# Patient Record
Sex: FEMALE | Race: WHITE | URBAN - METROPOLITAN AREA
[De-identification: names, ages, dates, MRNs, and addresses within clinical notes are randomized per-mention and may not be internally consistent; named-entity substitution may affect disease eponyms.]

---

## 2017-02-22 ENCOUNTER — OUTPATIENT (OUTPATIENT)
Dept: OUTPATIENT SERVICES | Facility: HOSPITAL | Age: 72
LOS: 1 days | Discharge: HOME | End: 2017-02-22

## 2017-03-18 ENCOUNTER — EMERGENCY (EMERGENCY)
Facility: HOSPITAL | Age: 72
LOS: 0 days | Discharge: HOME | End: 2017-03-19

## 2017-06-27 DIAGNOSIS — K59.00 CONSTIPATION, UNSPECIFIED: ICD-10-CM

## 2017-06-27 DIAGNOSIS — Z87.891 PERSONAL HISTORY OF NICOTINE DEPENDENCE: ICD-10-CM

## 2017-06-27 DIAGNOSIS — R10.30 LOWER ABDOMINAL PAIN, UNSPECIFIED: ICD-10-CM

## 2017-07-12 DIAGNOSIS — Z12.31 ENCOUNTER FOR SCREENING MAMMOGRAM FOR MALIGNANT NEOPLASM OF BREAST: ICD-10-CM

## 2017-12-24 ENCOUNTER — EMERGENCY (EMERGENCY)
Facility: HOSPITAL | Age: 72
LOS: 0 days | Discharge: HOME | End: 2017-12-25
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
## 2017-12-24 DIAGNOSIS — S61.012A LACERATION WITHOUT FOREIGN BODY OF LEFT THUMB WITHOUT DAMAGE TO NAIL, INITIAL ENCOUNTER: ICD-10-CM
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
## 2020-08-22 ENCOUNTER — INPATIENT (INPATIENT)
Facility: HOSPITAL | Age: 75
LOS: 1 days | Discharge: HOME | End: 2020-08-24
Attending: INTERNAL MEDICINE | Admitting: INTERNAL MEDICINE
Payer: MEDICARE

## 2020-08-22 ENCOUNTER — EMERGENCY (EMERGENCY)
Facility: HOSPITAL | Age: 75
LOS: 0 days | Discharge: HOME | End: 2020-08-22
Attending: EMERGENCY MEDICINE
Payer: MEDICARE

## 2020-08-22 VITALS
SYSTOLIC BLOOD PRESSURE: 125 MMHG | RESPIRATION RATE: 18 BRPM | WEIGHT: 115.08 LBS | OXYGEN SATURATION: 99 % | HEIGHT: 65 IN | HEART RATE: 75 BPM | DIASTOLIC BLOOD PRESSURE: 65 MMHG | TEMPERATURE: 97 F

## 2020-08-22 VITALS
TEMPERATURE: 97 F | HEIGHT: 65 IN | SYSTOLIC BLOOD PRESSURE: 216 MMHG | WEIGHT: 115.08 LBS | OXYGEN SATURATION: 94 % | HEART RATE: 101 BPM | RESPIRATION RATE: 20 BRPM | DIASTOLIC BLOOD PRESSURE: 96 MMHG

## 2020-08-22 VITALS
HEART RATE: 72 BPM | DIASTOLIC BLOOD PRESSURE: 68 MMHG | OXYGEN SATURATION: 99 % | SYSTOLIC BLOOD PRESSURE: 127 MMHG | RESPIRATION RATE: 18 BRPM | TEMPERATURE: 97 F

## 2020-08-22 DIAGNOSIS — Z02.9 ENCOUNTER FOR ADMINISTRATIVE EXAMINATIONS, UNSPECIFIED: ICD-10-CM

## 2020-08-22 DIAGNOSIS — R91.1 SOLITARY PULMONARY NODULE: ICD-10-CM

## 2020-08-22 DIAGNOSIS — R04.2 HEMOPTYSIS: ICD-10-CM

## 2020-08-22 DIAGNOSIS — F17.210 NICOTINE DEPENDENCE, CIGARETTES, UNCOMPLICATED: ICD-10-CM

## 2020-08-22 LAB
ALBUMIN SERPL ELPH-MCNC: 4.9 G/DL — SIGNIFICANT CHANGE UP (ref 3.5–5.2)
ALP SERPL-CCNC: 56 U/L — SIGNIFICANT CHANGE UP (ref 30–115)
ALT FLD-CCNC: 22 U/L — SIGNIFICANT CHANGE UP (ref 0–41)
ANION GAP SERPL CALC-SCNC: 12 MMOL/L — SIGNIFICANT CHANGE UP (ref 7–14)
APTT BLD: 32.4 SEC — SIGNIFICANT CHANGE UP (ref 27–39.2)
AST SERPL-CCNC: 28 U/L — SIGNIFICANT CHANGE UP (ref 0–41)
BASOPHILS # BLD AUTO: 0.08 K/UL — SIGNIFICANT CHANGE UP (ref 0–0.2)
BASOPHILS # BLD AUTO: 0.09 K/UL — SIGNIFICANT CHANGE UP (ref 0–0.2)
BASOPHILS NFR BLD AUTO: 0.9 % — SIGNIFICANT CHANGE UP (ref 0–1)
BASOPHILS NFR BLD AUTO: 1.1 % — HIGH (ref 0–1)
BILIRUB SERPL-MCNC: 0.2 MG/DL — SIGNIFICANT CHANGE UP (ref 0.2–1.2)
BLD GP AB SCN SERPL QL: SIGNIFICANT CHANGE UP
BUN SERPL-MCNC: 20 MG/DL — SIGNIFICANT CHANGE UP (ref 10–20)
CALCIUM SERPL-MCNC: 10.1 MG/DL — SIGNIFICANT CHANGE UP (ref 8.5–10.1)
CHLORIDE SERPL-SCNC: 104 MMOL/L — SIGNIFICANT CHANGE UP (ref 98–110)
CO2 SERPL-SCNC: 27 MMOL/L — SIGNIFICANT CHANGE UP (ref 17–32)
CREAT SERPL-MCNC: 0.7 MG/DL — SIGNIFICANT CHANGE UP (ref 0.7–1.5)
EOSINOPHIL # BLD AUTO: 0.09 K/UL — SIGNIFICANT CHANGE UP (ref 0–0.7)
EOSINOPHIL # BLD AUTO: 0.21 K/UL — SIGNIFICANT CHANGE UP (ref 0–0.7)
EOSINOPHIL NFR BLD AUTO: 1.1 % — SIGNIFICANT CHANGE UP (ref 0–8)
EOSINOPHIL NFR BLD AUTO: 2.3 % — SIGNIFICANT CHANGE UP (ref 0–8)
GLUCOSE SERPL-MCNC: 94 MG/DL — SIGNIFICANT CHANGE UP (ref 70–99)
HCT VFR BLD CALC: 38.8 % — SIGNIFICANT CHANGE UP (ref 37–47)
HCT VFR BLD CALC: 39.3 % — SIGNIFICANT CHANGE UP (ref 37–47)
HCT VFR BLD CALC: 43.3 % — SIGNIFICANT CHANGE UP (ref 37–47)
HGB BLD-MCNC: 12.7 G/DL — SIGNIFICANT CHANGE UP (ref 12–16)
HGB BLD-MCNC: 13 G/DL — SIGNIFICANT CHANGE UP (ref 12–16)
HGB BLD-MCNC: 14.1 G/DL — SIGNIFICANT CHANGE UP (ref 12–16)
IMM GRANULOCYTES NFR BLD AUTO: 0.3 % — SIGNIFICANT CHANGE UP (ref 0.1–0.3)
IMM GRANULOCYTES NFR BLD AUTO: 0.5 % — HIGH (ref 0.1–0.3)
INR BLD: 1.01 RATIO — SIGNIFICANT CHANGE UP (ref 0.65–1.3)
LACTATE SERPL-SCNC: 0.7 MMOL/L — SIGNIFICANT CHANGE UP (ref 0.7–2)
LYMPHOCYTES # BLD AUTO: 3.59 K/UL — HIGH (ref 1.2–3.4)
LYMPHOCYTES # BLD AUTO: 3.67 K/UL — HIGH (ref 1.2–3.4)
LYMPHOCYTES # BLD AUTO: 40.6 % — SIGNIFICANT CHANGE UP (ref 20.5–51.1)
LYMPHOCYTES # BLD AUTO: 42.1 % — SIGNIFICANT CHANGE UP (ref 20.5–51.1)
MCHC RBC-ENTMCNC: 30.3 PG — SIGNIFICANT CHANGE UP (ref 27–31)
MCHC RBC-ENTMCNC: 30.4 PG — SIGNIFICANT CHANGE UP (ref 27–31)
MCHC RBC-ENTMCNC: 30.4 PG — SIGNIFICANT CHANGE UP (ref 27–31)
MCHC RBC-ENTMCNC: 32.6 G/DL — SIGNIFICANT CHANGE UP (ref 32–37)
MCHC RBC-ENTMCNC: 32.7 G/DL — SIGNIFICANT CHANGE UP (ref 32–37)
MCHC RBC-ENTMCNC: 33.1 G/DL — SIGNIFICANT CHANGE UP (ref 32–37)
MCV RBC AUTO: 92 FL — SIGNIFICANT CHANGE UP (ref 81–99)
MCV RBC AUTO: 92.6 FL — SIGNIFICANT CHANGE UP (ref 81–99)
MCV RBC AUTO: 93.3 FL — SIGNIFICANT CHANGE UP (ref 81–99)
MONOCYTES # BLD AUTO: 0.47 K/UL — SIGNIFICANT CHANGE UP (ref 0.1–0.6)
MONOCYTES # BLD AUTO: 0.62 K/UL — HIGH (ref 0.1–0.6)
MONOCYTES NFR BLD AUTO: 5.5 % — SIGNIFICANT CHANGE UP (ref 1.7–9.3)
MONOCYTES NFR BLD AUTO: 6.9 % — SIGNIFICANT CHANGE UP (ref 1.7–9.3)
NEUTROPHILS # BLD AUTO: 4.25 K/UL — SIGNIFICANT CHANGE UP (ref 1.4–6.5)
NEUTROPHILS # BLD AUTO: 4.44 K/UL — SIGNIFICANT CHANGE UP (ref 1.4–6.5)
NEUTROPHILS NFR BLD AUTO: 49 % — SIGNIFICANT CHANGE UP (ref 42.2–75.2)
NEUTROPHILS NFR BLD AUTO: 49.7 % — SIGNIFICANT CHANGE UP (ref 42.2–75.2)
NRBC # BLD: 0 /100 WBCS — SIGNIFICANT CHANGE UP (ref 0–0)
PLAT MORPH BLD: NORMAL — SIGNIFICANT CHANGE UP
PLATELET # BLD AUTO: 167 K/UL — SIGNIFICANT CHANGE UP (ref 130–400)
PLATELET # BLD AUTO: 300 K/UL — SIGNIFICANT CHANGE UP (ref 130–400)
PLATELET # BLD AUTO: 300 K/UL — SIGNIFICANT CHANGE UP (ref 130–400)
PLATELET CLUMP BLD QL SMEAR: ABNORMAL
POTASSIUM SERPL-MCNC: 4.7 MMOL/L — SIGNIFICANT CHANGE UP (ref 3.5–5)
POTASSIUM SERPL-SCNC: 4.7 MMOL/L — SIGNIFICANT CHANGE UP (ref 3.5–5)
PROT SERPL-MCNC: 7 G/DL — SIGNIFICANT CHANGE UP (ref 6–8)
PROTHROM AB SERPL-ACNC: 11.6 SEC — SIGNIFICANT CHANGE UP (ref 9.95–12.87)
RBC # BLD: 4.19 M/UL — LOW (ref 4.2–5.4)
RBC # BLD: 4.27 M/UL — SIGNIFICANT CHANGE UP (ref 4.2–5.4)
RBC # BLD: 4.64 M/UL — SIGNIFICANT CHANGE UP (ref 4.2–5.4)
RBC # FLD: 13.4 % — SIGNIFICANT CHANGE UP (ref 11.5–14.5)
RBC # FLD: 13.4 % — SIGNIFICANT CHANGE UP (ref 11.5–14.5)
RBC # FLD: 13.5 % — SIGNIFICANT CHANGE UP (ref 11.5–14.5)
RBC BLD AUTO: NORMAL — SIGNIFICANT CHANGE UP
SARS-COV-2 RNA SPEC QL NAA+PROBE: SIGNIFICANT CHANGE UP
SODIUM SERPL-SCNC: 143 MMOL/L — SIGNIFICANT CHANGE UP (ref 135–146)
WBC # BLD: 8.21 K/UL — SIGNIFICANT CHANGE UP (ref 4.8–10.8)
WBC # BLD: 8.53 K/UL — SIGNIFICANT CHANGE UP (ref 4.8–10.8)
WBC # BLD: 9.18 K/UL — SIGNIFICANT CHANGE UP (ref 4.8–10.8)
WBC # FLD AUTO: 8.21 K/UL — SIGNIFICANT CHANGE UP (ref 4.8–10.8)
WBC # FLD AUTO: 8.53 K/UL — SIGNIFICANT CHANGE UP (ref 4.8–10.8)
WBC # FLD AUTO: 9.18 K/UL — SIGNIFICANT CHANGE UP (ref 4.8–10.8)

## 2020-08-22 PROCEDURE — 71046 X-RAY EXAM CHEST 2 VIEWS: CPT | Mod: 26

## 2020-08-22 PROCEDURE — 71260 CT THORAX DX C+: CPT | Mod: 26

## 2020-08-22 PROCEDURE — 99222 1ST HOSP IP/OBS MODERATE 55: CPT

## 2020-08-22 PROCEDURE — 99284 EMERGENCY DEPT VISIT MOD MDM: CPT | Mod: GC

## 2020-08-22 RX ORDER — PANTOPRAZOLE SODIUM 20 MG/1
40 TABLET, DELAYED RELEASE ORAL
Refills: 0 | Status: DISCONTINUED | OUTPATIENT
Start: 2020-08-22 | End: 2020-08-24

## 2020-08-22 RX ORDER — NICOTINE POLACRILEX 2 MG
1 GUM BUCCAL DAILY
Refills: 0 | Status: DISCONTINUED | OUTPATIENT
Start: 2020-08-22 | End: 2020-08-24

## 2020-08-22 RX ORDER — AZITHROMYCIN 500 MG/1
500 TABLET, FILM COATED ORAL ONCE
Refills: 0 | Status: COMPLETED | OUTPATIENT
Start: 2020-08-22 | End: 2020-08-22

## 2020-08-22 RX ORDER — CHLORHEXIDINE GLUCONATE 213 G/1000ML
1 SOLUTION TOPICAL
Refills: 0 | Status: DISCONTINUED | OUTPATIENT
Start: 2020-08-22 | End: 2020-08-24

## 2020-08-22 RX ORDER — CEFTRIAXONE 500 MG/1
1000 INJECTION, POWDER, FOR SOLUTION INTRAMUSCULAR; INTRAVENOUS ONCE
Refills: 0 | Status: COMPLETED | OUTPATIENT
Start: 2020-08-22 | End: 2020-08-22

## 2020-08-22 RX ORDER — TRANEXAMIC ACID 100 MG/ML
1000 INJECTION, SOLUTION INTRAVENOUS ONCE
Refills: 0 | Status: COMPLETED | OUTPATIENT
Start: 2020-08-22 | End: 2020-08-22

## 2020-08-22 RX ADMIN — CEFTRIAXONE 100 MILLIGRAM(S): 500 INJECTION, POWDER, FOR SOLUTION INTRAMUSCULAR; INTRAVENOUS at 13:12

## 2020-08-22 RX ADMIN — Medication 110 MILLIGRAM(S): at 18:37

## 2020-08-22 RX ADMIN — AZITHROMYCIN 255 MILLIGRAM(S): 500 TABLET, FILM COATED ORAL at 13:13

## 2020-08-22 RX ADMIN — TRANEXAMIC ACID 220 MILLIGRAM(S): 100 INJECTION, SOLUTION INTRAVENOUS at 15:36

## 2020-08-22 RX ADMIN — Medication 60 MILLIGRAM(S): at 18:37

## 2020-08-22 NOTE — ED PROVIDER NOTE - OBJECTIVE STATEMENT
The pt is a 75y F w/ no pmh returning to the ED for increased bloody sputum. She was seen here in the ED earlier with bright red bloody sputum since 3 am tonight. XR was normal and Hgb 14 so she was discharged with pulm and GI f/u.     No hx of this in the past. Says she felt like she had something in her throat and when she spit it out it was blood. This continued repeatedly until now. Not on AC. No hx of alcohol use. No recent illness or cough. Heavy smoker. Denies fever, headache, dizziness, chest pain, SOB, N/V, abdominal pain, diarrhea, melena, dysuria/hematuria.

## 2020-08-22 NOTE — ED PROVIDER NOTE - NS ED ROS FT
Constitutional: (-) fever  Eyes/ENT: (-) blurry vision, (-) epistaxis, (+) bloody sputum  Cardiovascular: (-) chest pain, (-) syncope  Respiratory: (-) cough, (-) shortness of breath  Gastrointestinal: (-) vomiting, (-) diarrhea  Musculoskeletal: (-) neck pain, (-) back pain, (-) joint pain  Integumentary: (-) rash, (-) edema  Neurological: (-) headache, (-) altered mental status  Psychiatric: (-) hallucinations  Allergic/Immunologic: (-) pruritus

## 2020-08-22 NOTE — H&P ADULT - NSHPLABSRESULTS_GEN_ALL_CORE
:  LAB RESULTS:                        13.0   8.53  )-----------( 300      ( 22 Aug 2020 10:45 )             39.3     143  |  104  |  20  ----------------------------<  94  4.7   |  27  |  0.7    Ca    10.1          TPro  7.0  /  Alb  4.9  /  TBili  0.2  /  DBili  x   /  AST  28  /  ALT  22  /  AlkPhos  56      PT/INR - ( 22 Aug 2020 10:45 )   PT: 11.60 sec;   INR: 1.01 ratio    PTT - ( 22 Aug 2020 10:45 )  PTT:32.4 sec    MICROBIOLOGY:  None to report    RADIOLOGY:  Reviewed CT Chest with IV contrast    ALLERGIES:  No Known Allergies  ===========================================================

## 2020-08-22 NOTE — ED ADULT NURSE NOTE - DOES PATIENT HAVE ADVANCE DIRECTIVE
Post-Fall Assessment  Date of Fall:   12/7/19  Time of Fall:   1615   Yes No N/A Comment   Was Patient on [place] Program? [x] [] []    Was the Fall Witnessed? [x] [x] [] Found pt on knees at chair   Were Clothes a Factor? [] [x] []    Was Patient Wearing Corrective Footwear? [x] [] []    Other Environmental Factors Involved? [] [x] []      Description of Fall  Who found the patient: Kateryna Davis  Where was the patient at the time of the fall:  End of chair  Brief description of fall: During rounds, found patient on knees on floor at chair. Her arms were still in chair applying pressure to chair alarm, therefore it did not go off. While assisting patient to stand, she began becoming combative and needed to be lowered to floor. At this point, pt was assisted to chair with 4 assists. Patient comments regarding fall:   \"I don't want to go to chair. \"  Medications potentially contributing to fall risk (such as Sedatives, Hypnotics, Antihypertensives, Narcotics, Psychotropics, Anticonvulsants):   none    Patient Assessment of Injury    (Please document Vital Signs in Doc Flowsheet)     Yes No   Patient hit his/her head [] [x]   Patient is taking an anticoagulant [] [x]   CT of Head requested [] [x]     Neurological Assessment Protocol: If the patient has hit his/her head during this fall,   a Neurological Assessment must be completed every 2 hours for 12 hours;   every 3 hours for 24 hours;   then every 4 hours for 24 hours. Document in Doc Flowsheets. Neurological Assessment Protocol initiated  no    If the patient did not hit his/her head during this fall, monitor vital signs every 8 hours. Notify the physician within 24 hours and document. Physician Notification  Please document under Provider Notification group within the Assessment (Complex Assessment) template of Doc Flowsheets.      Physician notified yes    House Supervisor/Clinical Manager Notified:   yes  Date:   12/7 Time:   56  Family Member Notified:   Harry Wadsworth   Date:   12/7 Time:   0814 No

## 2020-08-22 NOTE — CONSULT NOTE ADULT - SUBJECTIVE AND OBJECTIVE BOX
Patient is a 75y old  Female who presents with a chief complaint of hemoptysis    75 years old active smoker, no sig PMHX, presented to above. she reports that she woke up at 2 30 am felt tickle/ phlegm back off her throat than coughed and had fresh blood like 1/2 tea spoon, this happened 4 to 5 times than stopped this am after breakfast same happened coughed blood like teaspoon came to ED hb was 14 CXR was clear so was sent home, few hours later coughing blood recurred so came back to ED, Had CT angio done called for MICU, denies fever, chills, melena or vomiting blood, reports SOB worsening over few years, no pulmonary before, no inhalers, denies weight loss. takes baby ASA daily, no NSAIDS over the counter, only vitamins      PAST MEDICAL & SURGICAL HISTORY:  No pertinent past medical history  No significant past surgical history      SOCIAL HX:   Smoking +    FAMILY HISTORY:  father with lung cancer    REVIEW OF SYSTEMS see hpi      Allergies    No Known Allergies    Intolerances        : Home Meds:      PHYSICAL EXAM    ICU Vital Signs Last 24 Hrs  T(C): 36.8 (22 Aug 2020 14:33), Max: 36.8 (22 Aug 2020 14:33)  T(F): 98.3 (22 Aug 2020 14:33), Max: 98.3 (22 Aug 2020 14:33)  HR: 68 (22 Aug 2020 15:42) (65 - 101)  BP: 141/79 (22 Aug 2020 15:42) (125/65 - 216/96)  RR: 20 (22 Aug 2020 15:42) (18 - 20)  SpO2: 100% (22 Aug 2020 15:42) (94% - 100%)      General: comfortable, NAD  HEENT:  ASHIA              Lymph Nodes: No cervical LN   Lungs: Bilateral BS, r side rhocnhi  Cardiovascular: Regular  Abdomen: Soft, Positive BS  Extremities: No clubbing  Skin: Warm  Neurological: Non focal         LABS:                          13.0   8.53  )-----------( 300      ( 22 Aug 2020 10:45 )             39.3                                               08-22    143  |  104  |  20  ----------------------------<  94  4.7   |  27  |  0.7    Ca    10.1      22 Aug 2020 07:20    TPro  7.0  /  Alb  4.9  /  TBili  0.2  /  DBili  x   /  AST  28  /  ALT  22  /  AlkPhos  56  08-22      PT/INR - ( 22 Aug 2020 10:45 )   PT: 11.60 sec;   INR: 1.01 ratio         PTT - ( 22 Aug 2020 10:45 )  PTT:32.4 sec                                                                                     LIVER FUNCTIONS - ( 22 Aug 2020 07:20 )  Alb: 4.9 g/dL / Pro: 7.0 g/dL / ALK PHOS: 56 U/L / ALT: 22 U/L / AST: 28 U/L / GGT: x                                                                                                                                     CXR/ CT reviewed

## 2020-08-22 NOTE — ED PROVIDER NOTE - NSFOLLOWUPINSTRUCTIONS_ED_ALL_ED_FT
Please follow up with Gastroenterology, contact information provided.     For any new or worsening symptoms, please return to the Emergency Room. Please follow up with Gastroenterology and Pulmonology. Contact information provided.     For any new or worsening symptoms, please return to the Emergency Room.

## 2020-08-22 NOTE — CONSULT NOTE ADULT - ASSESSMENT
74 yo Female with hx of smoking 1PPD x 60 years, takes baby ASA at home presents to the ED for hemoptysis this AM. - pt stable      Plan:  ·	pt refusing to be scoped by ENT with fiberoptic flexible laryngoscopy, and requests to be under sedation due to inability to tolerate scope  ·	recommend pulmonology for bronchoscopy  ·	consider GI consult   ·	continue to monitor vitals   ·	will discuss with attending 74 yo Female with hx of smoking 1PPD x 60 years, takes baby ASA at home presents to the ED for hemoptysis this AM. - pt stable      Plan:  ·	pt refusing to be scoped by ENT with fiberoptic flexible laryngoscopy, and requests to be under sedation due to inability to tolerate scope  ·	recommend pulmonology for bronchoscopy  ·	consider GI consult   ·	continue to monitor vitals   ·	monitor H/H, transfuse prn   ·	will discuss with attending 74 yo Female with hx of smoking 1PPD x 60 years, takes baby ASA at home presents to the ED for hemoptysis this AM. - pt stable      Plan:  ·	pt refusing to be scoped by ENT with fiberoptic flexible laryngoscopy, and requests to be under sedation due to inability to tolerate scope. ED team at bedside and aware.   ·	recommend pulmonology for bronchoscopy  ·	consider GI consult   ·	continue to monitor vitals   ·	monitor H/H, transfuse prn   ·	will discuss with attending

## 2020-08-22 NOTE — H&P ADULT - ASSESSMENT
Hemoptysis  RUL & RML groundglass opacities  RLL 2mm pulmonary nodule  Hx of Smoking    PLAN:      Diet: NPO except medications  GI PPx: Protonix  DVT: PPx: SCD for now  Activity: As tolerated  Status: Full code    Disposition: Active care; Home vs. discharge to [facility] when medically optimized 1.	Hemoptysis; Etiology unclear  2.	RUL & RML groundglass opacities; Possible PNA  3.	RLL 2mm pulmonary nodule  4.	Hx of Smoking x50 years    PLAN:  ·	Start IV Doxycycline 100mg Q12H  ·	Start IV Solumedrol 60mg Q12H  ·	Start PO Protonix 40mg PO Daily  ·	Holding hold ASA 81mg PO Daily  ·	Plan for Bronchoscopy in the morning  ·	Will call IR to have on board  ·	Maintain NPO for now    Diet: NPO except medications  GI PPx: Protonix  DVT: PPx: SCD for now  Activity: As tolerated  Status: Full code    Disposition: Active care; Home vs. discharge to [facility] when medically optimized 1.	Hemoptysis; Etiology unclear  2.	RUL & RML groundglass opacities; Possible PNA  3.	RLL 2mm pulmonary nodule  4.	Hx of Smoking x50 years    PLAN:  ·	Start IV Doxycycline 100mg Q12H  ·	Start IV Solumedrol 60mg Q12H  ·	Start PO Protonix 40mg PO Daily  ·	Holding home ASA 81mg PO Daily  ·	Plan for Bronchoscopy in the morning  ·	Will call IR to have on board  ·	Maintain NPO for now    Diet: NPO except medications  GI PPx: Protonix  DVT: PPx: SCD for now  Activity: As tolerated  Status: Full code    Disposition: Active care; Home vs. discharge to [facility] when medically optimized

## 2020-08-22 NOTE — ED PROVIDER NOTE - NS ED ROS FT
Constitutional: (-) fever  Eyes/ENT: (-) blurry vision, (-) epistaxis, (+) spitting up blood  Cardiovascular: (-) chest pain, (-) syncope  Respiratory: (-) cough, (-) shortness of breath  Gastrointestinal: (-) vomiting, (-) diarrhea  Musculoskeletal: (-) neck pain, (-) back pain, (-) joint pain  Integumentary: (-) rash, (-) edema  Neurological: (-) headache, (-) altered mental status  Psychiatric: (-) hallucinations  Allergic/Immunologic: (-) pruritus

## 2020-08-22 NOTE — ED PROVIDER NOTE - PHYSICAL EXAMINATION
Vital Signs: I have reviewed the initial vital signs.  Constitutional: well-nourished, appears stated age, (+) appears anxious  HEENT: (+) bringing up bright red blood  Cardiovascular: regular rate, regular rhythm, well-perfused extremities  Respiratory: unlabored respiratory effort, clear to auscultation bilaterally  Gastrointestinal: soft, non-tender abdomen  Musculoskeletal: supple neck, no lower extremity edema  Integumentary: warm, dry, no rash  Neurologic: awake, alert, extremities’ motor and sensory functions grossly intact  Psychiatric: appropriate mood, appropriate affect

## 2020-08-22 NOTE — ED PROVIDER NOTE - CLINICAL SUMMARY MEDICAL DECISION MAKING FREE TEXT BOX
Patient has no chest pain no sob she is asymptomatic with no fevers or cough.  She is asymptomatic here. we obtained cxr and labs she is not tachycardic with no hypoxia, she denies any fevers or chills no suspicion for pe or infection however, I instructed patient to have outpatient bronchoscopy, egd as an outpatient instructed here to return to the ED for any symptoms or return

## 2020-08-22 NOTE — ED PROVIDER NOTE - CARE PLAN
Principal Discharge DX:	Hemoptysis Principal Discharge DX:	Major hemoptysis  Secondary Diagnosis:	Ground glass opacity present on imaging of lung

## 2020-08-22 NOTE — H&P ADULT - NSHPPHYSICALEXAM_GEN_ALL_CORE
:  VITAL SIGNS: Last 24 Hours  T(C): 36.8 (22 Aug 2020 14:33), Max: 36.8 (22 Aug 2020 14:33)  T(F): 98.3 (22 Aug 2020 14:33), Max: 98.3 (22 Aug 2020 14:33)  HR: 68 (22 Aug 2020 15:42) (65 - 101)  BP: 141/79 (22 Aug 2020 15:42) (125/65 - 216/96)  BP(mean): --  RR: 20 (22 Aug 2020 15:42) (18 - 20)  SpO2: 100% (22 Aug 2020 15:42) (94% - 100%)    PHYSICAL EXAM:  GENERAL:   Awake, alert; NAD.  HEENT:  Head NC; Conjunctivae pink, Sclera anicteric; Oral mucosa moist.  CARDIO:   Regular rate; Regular rhythm; S1 & S2.  RESP:   No rales, wheezing, or rhonchi appreciated.  GI:   Soft; NT/ND; BS; No guarding; No rebound tenderness.  MSK/EXT:   Strength UE 5/5; Strength LE 5/5; No edema in LE.  SKIN:   Intact. :  VITAL SIGNS: Last 24 Hours  T(C): 36.8 (22 Aug 2020 14:33), Max: 36.8 (22 Aug 2020 14:33)  T(F): 98.3 (22 Aug 2020 14:33), Max: 98.3 (22 Aug 2020 14:33)  HR: 68 (22 Aug 2020 15:42) (65 - 101)  BP: 141/79 (22 Aug 2020 15:42) (125/65 - 216/96)  BP(mean): --  RR: 20 (22 Aug 2020 15:42) (18 - 20)  SpO2: 100% (22 Aug 2020 15:42) (94% - 100%)    PHYSICAL EXAM:  GENERAL:   Awake, alert; NAD.  HEENT:  Head NC; Conjunctivae pink, Sclera anicteric; Oral mucosa moist.  CARDIO:   Regular-increased rate; Regular rhythm; S1 & S2.  RESP:   No rales, wheezing, or rhonchi appreciated.  GI:   Soft; NT/ND; BS; No guarding; No rebound tenderness.  MSK/EXT:   Strength UE 5/5; Strength LE 5/5; No edema in LE.  SKIN:   Intact.

## 2020-08-22 NOTE — ED PROVIDER NOTE - CLINICAL SUMMARY MEDICAL DECISION MAKING FREE TEXT BOX
Patient received as a transfer from [facility] for continued hemoptysis. Patient had full blood work and a CTA of the chest.  She received Ceftriaxone and Azithromycin.  Case was discussed with Dr. Oswald Ly and he recommended transfer to [facility] for ENT evaluation.  Patient will require admission for monitoring of amount of hemoptysis and for likely bronchoscopy. Nebulized TXA given in the ED.  ENT consulted and evaluated patient at bedside. CT scan reviewed and results discussed with patient. Patient received as a transfer from [facility] for continued hemoptysis. Patient had full blood work and a CTA of the chest.  She received Ceftriaxone and Azithromycin.  Case was discussed with Dr. Oswald Ly and he recommended transfer to [facility] for ENT evaluation.  Patient will require admission for monitoring of amount of hemoptysis and for likely bronchoscopy. Nebulized TXA given in the ED.  ENT consulted and evaluated patient at bedside. CT scan reviewed and results discussed with patient. Patient already given antibiotics.  ICU fellow at [facility] called and approves patient for admission to the ICU at [facility]. Patient remains hemodynamically stable and is protecting her airway. Clinically this is not massive hemoptysis but is likely going to be major hemoptysis.

## 2020-08-22 NOTE — ED PROVIDER NOTE - PATIENT PORTAL LINK FT
You can access the FollowMyHealth Patient Portal offered by [facility] by registering at the following website: [email or web address]. By joining [employer]’s FollowMyHealth portal, you will also be able to view your health information using other applications (apps) compatible with our system.

## 2020-08-22 NOTE — H&P ADULT - NSHPSOCIALHISTORY_GEN_ALL_CORE
:  Lives with  Tobacco:  EtOH:  Other: :  Former   Lives with   Tobacco: Smoker x50 years; Currently 1 PPD  EtOH: Never  Other: Never

## 2020-08-22 NOTE — ED PROVIDER NOTE - PHYSICAL EXAMINATION
Vital Signs: I have reviewed the initial vital signs.  Constitutional: well-nourished, appears stated age, no acute distress  HEENT: Oropharynx normal without blood   Cardiovascular: regular rate, regular rhythm, well-perfused extremities  Respiratory: unlabored respiratory effort, (+) diminished breath sounds on the R compared to L  Gastrointestinal: soft, non-tender abdomen  Musculoskeletal: supple neck, no lower extremity edema  Integumentary: warm, dry, no rash  Neurologic: awake, alert, extremities’ motor and sensory functions grossly intact  Psychiatric: appropriate mood, appropriate affect

## 2020-08-22 NOTE — ED PROVIDER NOTE - CARE PROVIDER_API CALL
Karen Cameron  GASTROENTEROLOGY  [address] [place] [address]  Phone: [phone]  Fax: [phone]  Follow Up Time: 1-3 Days Karen Cameron  GASTROENTEROLOGY  [address] [place] [address]  Phone: [phone]  Fax: [phone]  Follow Up Time: 1-3 Days    Jv Mcclelland)  Critical Care Medicine; Internal Medicine; Pulmonary Disease; Sleep Medicine  [address] [place] [address]  Phone: [phone]  Fax: [phone]  Follow Up Time: 1-3 Days

## 2020-08-22 NOTE — CONSULT NOTE ADULT - SUBJECTIVE AND OBJECTIVE BOX
ENT CONSULT NOTE    HPI: Patient is a 76 yo Female with hx of smoking 1PPD x 60 years, takes baby ASA at home presents to the ED for hemoptysis since 2:30 this morning. ENT called for upper airway scope. Pt seen and examined at bedside. Pt admits waking up this morning and felt the need to cough up something and notice bright red bloody sputum. Pt states it resolved after a few hours and was able to drink some coffee and apple juice. Pt then felt the urge to cough again and noticed blood clots which prompted her to come to the ED. Pt admits to tolerating liquids & solids. Pt denies any previous episodes of hemoptysis. Pt denies fever, chills, N/V, abdominal pain, difficulty breathing, SOB, dysphagia, odynophagia, hoarseness, recent illnesses, pooling, or drooling of secretions.     ROS:   All negative except as noted in HPI    PAST MEDICAL & SURGICAL HISTORY:  No pertinent past medical history  No significant past surgical history      Social History: **??**    MEDICATIONS  (STANDING):    MEDICATIONS  (PRN):    Allergies    No Known Allergies    Intolerances        Vital Signs Last 24 Hrs  T(C): 36.8 (22 Aug 2020 14:33), Max: 36.8 (22 Aug 2020 14:33)  T(F): 98.3 (22 Aug 2020 14:33), Max: 98.3 (22 Aug 2020 14:33)  HR: 68 (22 Aug 2020 15:42) (65 - 101)  BP: 141/79 (22 Aug 2020 15:42) (125/65 - 216/96)  BP(mean): --  RR: 20 (22 Aug 2020 15:42) (18 - 20)  SpO2: 100% (22 Aug 2020 15:42) (94% - 100%)    PHYSICAL EXAM:  Gen: NAD, awake/alert. No drooling or pooling of secretions. No respiratory distress. No stridor or stertor. phonating appropriately   Skin: Warm/pink. Non diaphoretic  Head: Normocephalic, Atraumatic  Face: No edema, erythema, or fluctuance. Parotid glands soft without mass  Eyes: EOMI, no scleral injection  Nose: Nares bilaterally patent, no discharge or bleeding.   Mouth: Oral mucosa moist/pink, uvula midline, posterior oropharynx without edema, erythema, or active bleeding. no FOM tenderness. Dentition intact.   Neck: Flat, supple, no lymphadenopathy, trachea midline  Resp: Breathing easily, no wheezing  CV: extremities without edema  GI: Soft, nondistended   Musculoskeletal: moving all extremeties x 4    Fiberoptic flexible laryngoscopy performed - able to scope down to nasopharynx, however pt refusing to tolerate rest of the exam and requested termination.     LABS:                        13.0   8.53  )-----------( 300      ( 22 Aug 2020 10:45 )             39.3    08-22    143  |  104  |  20  ----------------------------<  94  4.7   |  27  |  0.7    Ca    10.1      22 Aug 2020 07:20    TPro  7.0  /  Alb  4.9  /  TBili  0.2  /  DBili  x   /  AST  28  /  ALT  22  /  AlkPhos  56  08-22   PT/INR - ( 22 Aug 2020 10:45 )   PT: 11.60 sec;   INR: 1.01 ratio         PTT - ( 22 Aug 2020 10:45 )  PTT:32.4 sec    IMAGING/ADDITIONAL STUDIES:   < from: CT Chest w/ IV Cont (08.22.20 @ 12:03) >  EXAM:  CT CHEST IC            PROCEDURE DATE:  08/22/2020            INTERPRETATION:  CLINICAL INDICATION: Hemoptysis    TECHNIQUE:    Multislice helical sections were obtained from the thoracic inlet to the lung bases without intravenous contrast.Coronal, sagittal and 3D/MIP reformatted images are also submitted.    COMPARISON CT: None.    INTERPRETATION:    AIRWAYS, LUNGS AND PLEURA: Central airways are patent. Emphysema. Patchy peribronchial groundglass opacities in the medial right upper and middle lobes. 2 mm right lower lobe nodules (4/95 and 4/196) Biapical scarring. Additional scattered areas of linear atelectasis/scarring. No pleural effusion or pneumothorax.      MEDIASTINUM: There are no enlarged mediastinal, hilar or axillary lymph nodes. The visualized portion of the thyroid gland is unremarkable. Underdistended esophagus limiting evaluation.    HEART AND VESSELS: The heart is normal in size. There are aortic calcifications. The thoracic aorta has a normal caliber. There is no pericardial effusion.    UPPER ABDOMEN: Left renal cyst. Scattered pancreatic calcifications may represent sequela of chronic pancreatitis. Punctate calcified hepatic granuloma.    BONES AND SOFT TISSUES: Degenerative changes of the spine.      IMPRESSION:    1.  Patchy peribronchial groundglass opacities in the right upper and middle lobes, likely on the basis of small airway infectious/inflammatory process.  2.  2 mm right lower lobe pulmonary nodules. 1 year chest CT follow-up recommended.      MASSIEL ESTRADA M.D., RESIDENT RADIOLOGIST  This document has been electronically signed.  JAY BLAS M.D., ATTENDING RADIOLOGIST  This document has been electronically signed. Aug 22 2020  1:11PM                < end of copied text >

## 2020-08-22 NOTE — CONSULT NOTE ADULT - ASSESSMENT
IMPRESSION:    Hemoptysis ( non massive) stable hemodynamically s/p chest ct   active smoker      PLAN:    CNS: avoid CNS depressant    HEENT:  Oral care    PULMONARY:  HOB @ 45 degrees, aspiration precaution, cough suppressant, solumedrol 60 q 12h, pulmonary consult    CARDIOVASCULAR: IVF +    GI: GI prophylaxis                                          Feeding NPO    RENAL:  F/u  lytes.  Correct as needed. accurate I/O, protonix    INFECTIOUS DISEASE: Doxy 100 q 12h    HEMATOLOGICAL:  DVT prophylaxis. serial CBC    ENDOCRINE:  Follow up FS.  Insulin protocol if needed.    CODE STATUS: FULL CODE    DISPOSITION: spoke with ED staff, to be transferred [place] for ENT/ pul eval/ bronchoscopy (argon if needed) per pul/ IR / CT sx if needed

## 2020-08-22 NOTE — ED PROVIDER NOTE - PROVIDER TOKENS
PROVIDER:[TOKEN:[[id number]],FOLLOWUP:[1-3 Days]] PROVIDER:[TOKEN:[[id number]],FOLLOWUP:[1-3 Days]],PROVIDER:[TOKEN:[[id number]],FOLLOWUP:[1-3 Days]]

## 2020-08-22 NOTE — ED ADULT NURSE REASSESSMENT NOTE - NS ED NURSE REASSESS COMMENT FT1
pt transferred to [facility] ED from [facility] for "coughing up blood". as per MD, pt does not require airborne precautions, CT was completed in [facility]. pt has left AC 16g intact, right FA 18g intact. A&Ox4, placed on cardiac monitor. TXA administered as ordered via nebulizer.

## 2020-08-22 NOTE — ED PROVIDER NOTE - ATTENDING CONTRIBUTION TO CARE
I was present for and supervised the key and critical aspects of the procedures performed during the care of the patient. patient presents as a return to the ED for evaluation of recurrent hemoptysis.  She has no chest pain, sob, states it started to recur after drinking coffee.  She is stable able to speak in full sentences   On physical exam patient is speaking in full sentences protecting her airway, cta b/l, rrr s1s2 noted radial pulses 2 += abd-soft ext from   A/P- given patients recurrence of symptoms we obtained repeat cbc, pt, and ct of the chest, I have consulted pulm who evaluated patient in ed and advises transfer to [facility] for further evaluation and most likely bronchoscopy

## 2020-08-22 NOTE — ED PROVIDER NOTE - PROGRESS NOTE DETAILS
Pt bringing up more blood now, bottom of pink basin full of bright red blood. Spoke to Radiology - no pathology on CT which explains the bleeding at this time. Pt will be administered antibiotics. 2nd IV. Discussed CT results with pt and family, pt saying she will not stay in the hospital unless she will be undergoing bronchoscopy. Called ICU for approval, will get back to me.   -Dr. Palacios Patient [place] [facility] [place], still coughing up blood periodically, otherwise states she has no symptoms. Plan to give nebulized TXA, will get ENT for upper airway scope. Upper airway scope attempted by ENT, did not visualize any pooling of blood around the oropharynx, patient did not tolerate rest of the exam and requested termination. Plan for admission to ICU for bronchoscopy per Dr. Oswald Ly. Upper airway scope attempted by ENT, did not visualize any pooling of blood around the oropharynx, patient did not tolerate rest of the exam and requested termination. ED work up reviewed.  Chace Pulmonary fellow spoken to and approves patient for MICU.  Patient is already COVID negative. Intensivist on call for Chace is Dr. Saldivar (Patient initially admitted by mistake to Dr. Oswald Ly and admission changed to Dr. Saldivar). Patient aware she requires admission for further work up, monitoring, and testing. Hemoptysis has currently stopped s/p TXA.

## 2020-08-22 NOTE — H&P ADULT - HISTORY OF PRESENT ILLNESS
This is a 75 year old female with a significant smoking history who presented to the ED with a cc/o HEMOPTYSIS x1 day. This is a 75 year old female with a significant smoking history who presented to the ED with a cc/o HEMOPTYSIS x1 day. On the morning of presentation, she awoke from her sleep with the feeling of phlegm in her throat, however, produced bloody bright red sputum. Her symptoms persisted and prompted her to go to the ED at [facility]. She was discharged as her workup was mostly unremarkable with advice to return if symptoms recurred. She eventually did return hours later as she began to produced larger amounts of bright red blood, and was transfered to [facility] for further workup. She has never had similar symptoms in the past. She reported travel to the [facility] in March 2020.     ROS was only positive for intermittent occipital headaches, and was otherwise negative.

## 2020-08-22 NOTE — ED PROVIDER NOTE - ATTENDING CONTRIBUTION TO CARE
I was present for and supervised the key and critical aspects of the procedures performed during the care of the patient.  At 3 am last pm she noted that she felt like something was in her throat she coughed and noted blood she denies any chest pain, sob she denies any other cough fevers or chills she denies any abdominal pain or vomiting she notes similar symptoms in the past over the past several months specifically after eating cheese.    On physical exam she is nc/at perrla eomi oropharynx clear cta b/l, rrr s1s2 noted abd-soft nt nd bs+ ext from with no focal deficits   a/p- we obtained labs as well as cxr.   Patient has no chest pain no sob she is asymptomatic with no fevers or cough.  She is asymptomatic here. we obtained cxr and labs she is not tachycardic with no hypoxia, she denies any fevers or chills no suspicion for pe or infection however, I instructed patient to have outpatient bronchoscopy, egd as an outpatient instructed here to return to the ED for any symptoms or return

## 2020-08-22 NOTE — ED PROVIDER NOTE - OBJECTIVE STATEMENT
The pt is a 75y F w/ no pmh presenting with bright red blood orally since 3 am tonight. No hx of this in the past. Says she felt like she had something in her throat and when she spit it out it was blood. This continued repeatedly until now. Not on AC. No hx of alcohol use. No recent illness or cough. Denies fever, headache, dizziness, chest pain, SOB, N/V, abdominal pain, diarrhea, melena, dysuria/hematuria. The pt is a 75y F w/ no pmh presenting with bright red bloody sputum since 3 am tonight. No hx of this in the past. Says she felt like she had something in her throat and when she spit it out it was blood. This continued repeatedly until now. Not on AC. No hx of alcohol use. No recent illness or cough. Denies fever, headache, dizziness, chest pain, SOB, N/V, abdominal pain, diarrhea, melena, dysuria/hematuria.

## 2020-08-23 LAB
ALBUMIN SERPL ELPH-MCNC: 4 G/DL — SIGNIFICANT CHANGE UP (ref 3.5–5.2)
ALP SERPL-CCNC: 46 U/L — SIGNIFICANT CHANGE UP (ref 30–115)
ALT FLD-CCNC: 17 U/L — SIGNIFICANT CHANGE UP (ref 0–41)
ANION GAP SERPL CALC-SCNC: 10 MMOL/L — SIGNIFICANT CHANGE UP (ref 7–14)
APTT BLD: 25.7 SEC — LOW (ref 27–39.2)
AST SERPL-CCNC: 23 U/L — SIGNIFICANT CHANGE UP (ref 0–41)
BASOPHILS # BLD AUTO: 0.02 K/UL — SIGNIFICANT CHANGE UP (ref 0–0.2)
BASOPHILS NFR BLD AUTO: 0.4 % — SIGNIFICANT CHANGE UP (ref 0–1)
BILIRUB SERPL-MCNC: 0.5 MG/DL — SIGNIFICANT CHANGE UP (ref 0.2–1.2)
BUN SERPL-MCNC: 19 MG/DL — SIGNIFICANT CHANGE UP (ref 10–20)
CALCIUM SERPL-MCNC: 9.2 MG/DL — SIGNIFICANT CHANGE UP (ref 8.5–10.1)
CHLORIDE SERPL-SCNC: 104 MMOL/L — SIGNIFICANT CHANGE UP (ref 98–110)
CO2 SERPL-SCNC: 24 MMOL/L — SIGNIFICANT CHANGE UP (ref 17–32)
CREAT SERPL-MCNC: 0.7 MG/DL — SIGNIFICANT CHANGE UP (ref 0.7–1.5)
EOSINOPHIL # BLD AUTO: 0 K/UL — SIGNIFICANT CHANGE UP (ref 0–0.7)
EOSINOPHIL NFR BLD AUTO: 0 % — SIGNIFICANT CHANGE UP (ref 0–8)
ERYTHROCYTE [SEDIMENTATION RATE] IN BLOOD: 12 MM/HR — SIGNIFICANT CHANGE UP (ref 0–20)
GLUCOSE SERPL-MCNC: 136 MG/DL — HIGH (ref 70–99)
HCT VFR BLD CALC: 36.5 % — LOW (ref 37–47)
HCT VFR BLD CALC: 39.4 % — SIGNIFICANT CHANGE UP (ref 37–47)
HGB BLD-MCNC: 12.2 G/DL — SIGNIFICANT CHANGE UP (ref 12–16)
HGB BLD-MCNC: 12.8 G/DL — SIGNIFICANT CHANGE UP (ref 12–16)
IMM GRANULOCYTES NFR BLD AUTO: 0.4 % — HIGH (ref 0.1–0.3)
INR BLD: 1.13 RATIO — SIGNIFICANT CHANGE UP (ref 0.65–1.3)
LACTATE SERPL-SCNC: 0.8 MMOL/L — SIGNIFICANT CHANGE UP (ref 0.7–2)
LYMPHOCYTES # BLD AUTO: 1.03 K/UL — LOW (ref 1.2–3.4)
LYMPHOCYTES # BLD AUTO: 18.4 % — LOW (ref 20.5–51.1)
MAGNESIUM SERPL-MCNC: 2 MG/DL — SIGNIFICANT CHANGE UP (ref 1.8–2.4)
MCHC RBC-ENTMCNC: 30 PG — SIGNIFICANT CHANGE UP (ref 27–31)
MCHC RBC-ENTMCNC: 30.6 PG — SIGNIFICANT CHANGE UP (ref 27–31)
MCHC RBC-ENTMCNC: 32.5 G/DL — SIGNIFICANT CHANGE UP (ref 32–37)
MCHC RBC-ENTMCNC: 33.4 G/DL — SIGNIFICANT CHANGE UP (ref 32–37)
MCV RBC AUTO: 91.5 FL — SIGNIFICANT CHANGE UP (ref 81–99)
MCV RBC AUTO: 92.5 FL — SIGNIFICANT CHANGE UP (ref 81–99)
MONOCYTES # BLD AUTO: 0.1 K/UL — SIGNIFICANT CHANGE UP (ref 0.1–0.6)
MONOCYTES NFR BLD AUTO: 1.8 % — SIGNIFICANT CHANGE UP (ref 1.7–9.3)
NEUTROPHILS # BLD AUTO: 4.42 K/UL — SIGNIFICANT CHANGE UP (ref 1.4–6.5)
NEUTROPHILS NFR BLD AUTO: 79 % — HIGH (ref 42.2–75.2)
NRBC # BLD: 0 /100 WBCS — SIGNIFICANT CHANGE UP (ref 0–0)
NRBC # BLD: 0 /100 WBCS — SIGNIFICANT CHANGE UP (ref 0–0)
PHOSPHATE SERPL-MCNC: 3.8 MG/DL — SIGNIFICANT CHANGE UP (ref 2.1–4.9)
PLATELET # BLD AUTO: 176 K/UL — SIGNIFICANT CHANGE UP (ref 130–400)
PLATELET # BLD AUTO: 361 K/UL — SIGNIFICANT CHANGE UP (ref 130–400)
POTASSIUM SERPL-MCNC: 4.4 MMOL/L — SIGNIFICANT CHANGE UP (ref 3.5–5)
POTASSIUM SERPL-SCNC: 4.4 MMOL/L — SIGNIFICANT CHANGE UP (ref 3.5–5)
PROT SERPL-MCNC: 6.2 G/DL — SIGNIFICANT CHANGE UP (ref 6–8)
PROTHROM AB SERPL-ACNC: 13 SEC — HIGH (ref 9.95–12.87)
RBC # BLD: 3.99 M/UL — LOW (ref 4.2–5.4)
RBC # BLD: 4.26 M/UL — SIGNIFICANT CHANGE UP (ref 4.2–5.4)
RBC # FLD: 13.3 % — SIGNIFICANT CHANGE UP (ref 11.5–14.5)
RBC # FLD: 13.4 % — SIGNIFICANT CHANGE UP (ref 11.5–14.5)
SODIUM SERPL-SCNC: 138 MMOL/L — SIGNIFICANT CHANGE UP (ref 135–146)
WBC # BLD: 10.31 K/UL — SIGNIFICANT CHANGE UP (ref 4.8–10.8)
WBC # BLD: 5.59 K/UL — SIGNIFICANT CHANGE UP (ref 4.8–10.8)
WBC # FLD AUTO: 10.31 K/UL — SIGNIFICANT CHANGE UP (ref 4.8–10.8)
WBC # FLD AUTO: 5.59 K/UL — SIGNIFICANT CHANGE UP (ref 4.8–10.8)

## 2020-08-23 PROCEDURE — 71045 X-RAY EXAM CHEST 1 VIEW: CPT | Mod: 26

## 2020-08-23 RX ORDER — DIPHENHYDRAMINE HCL 50 MG
25 CAPSULE ORAL EVERY 6 HOURS
Refills: 0 | Status: DISCONTINUED | OUTPATIENT
Start: 2020-08-23 | End: 2020-08-24

## 2020-08-23 RX ORDER — IPRATROPIUM/ALBUTEROL SULFATE 18-103MCG
3 AEROSOL WITH ADAPTER (GRAM) INHALATION EVERY 4 HOURS
Refills: 0 | Status: DISCONTINUED | OUTPATIENT
Start: 2020-08-23 | End: 2020-08-24

## 2020-08-23 RX ORDER — IPRATROPIUM/ALBUTEROL SULFATE 18-103MCG
3 AEROSOL WITH ADAPTER (GRAM) INHALATION EVERY 6 HOURS
Refills: 0 | Status: DISCONTINUED | OUTPATIENT
Start: 2020-08-23 | End: 2020-08-24

## 2020-08-23 RX ADMIN — Medication 110 MILLIGRAM(S): at 17:19

## 2020-08-23 RX ADMIN — Medication 1 PATCH: at 13:41

## 2020-08-23 RX ADMIN — Medication 60 MILLIGRAM(S): at 05:48

## 2020-08-23 RX ADMIN — Medication 25 MILLIGRAM(S): at 22:03

## 2020-08-23 RX ADMIN — Medication 110 MILLIGRAM(S): at 05:23

## 2020-08-23 RX ADMIN — CHLORHEXIDINE GLUCONATE 1 APPLICATION(S): 213 SOLUTION TOPICAL at 05:24

## 2020-08-23 RX ADMIN — Medication 3 MILLILITER(S): at 20:55

## 2020-08-23 RX ADMIN — Medication 60 MILLIGRAM(S): at 17:18

## 2020-08-23 NOTE — CONSULT NOTE ADULT - SUBJECTIVE AND OBJECTIVE BOX
Patient is a 75y old  Female who presents with a chief complaint of Hemoptysis (23 Aug 2020 07:47)      HPI:  This is a 75 year old female with a significant smoking history who presented to the ED with a cc/o HEMOPTYSIS x1 day. On the morning of presentation, she awoke from her sleep with the feeling of phlegm in her throat, however, produced bloody bright red sputum. Her symptoms persisted and prompted her to go to the ED at [facility]. She was discharged as her workup was mostly unremarkable with advice to return if symptoms recurred. She eventually did return hours later as she began to produced larger amounts of bright red blood, and was transfered to [facility] for further workup. She has never had similar symptoms in the past. She reported travel to the [facility] in March 2020.     ROS was only positive for intermittent occipital headaches, and was otherwise negative. (22 Aug 2020 16:56)      PAST MEDICAL & SURGICAL HISTORY:  Diverticulosis  No significant past surgical history      SOCIAL HX:   Smoking    Active                      ETOH                            Other    FAMILY HISTORY:  FHx: lung cancer: Father  :  No known cardiovacular family hisotry     Review Of Systems:     All ROS are negative except per HPI       Allergies    No Known Allergies    Intolerances          PHYSICAL EXAM    ICU Vital Signs Last 24 Hrs  T(C): 35.8 (23 Aug 2020 08:00), Max: 36.8 (22 Aug 2020 14:33)  T(F): 96.5 (23 Aug 2020 08:00), Max: 98.3 (22 Aug 2020 14:33)  HR: 68 (23 Aug 2020 08:00) (58 - 101)  BP: 114/63 (23 Aug 2020 08:00) (102/74 - 216/96)  BP(mean): 78 (23 Aug 2020 08:00) (78 - 108)  ABP: --  ABP(mean): --  RR: 14 (23 Aug 2020 08:00) (12 - 30)  SpO2: 97% (23 Aug 2020 08:00) (94% - 100%)      CONSTITUTIONAL:  Well nourished.  NAD    ENT:   Airway patent,   Mouth with normal mucosa.   No thrush    EYES:   pupils equal,   round and reactive to light.    CARDIAC:   Normal rate,   Regular rhythm.    Heart sounds S1, S2.   No edema      Vascular:   normal systolic impulse  no bruits    RESPIRATORY:   No wheezing   Normal chest expansion  No use of accessory muscles    GASTROINTESTINAL:  Abdomen soft   Non-tender,   No guarding,   + BS    GENITOURINARY  normal genitalia for sex  no edema    MUSCULOSKELETAL:   Range of motion is not limited,  Nno clubbing, cyanosis    NEUROLOGICAL:   Alert and oriented   No motor or sensory deficits.  Pertinent DTRs normal    SKIN:   Skin normal color for race,   Warm and dry  No evidence of rash.    PSYCHIATRIC:   Normal mood and affect.   No apparent risk to self or others.    HEME LYMPH:   No cervical  lymphadenopathy.  No inguinal lymphadenopathy            08-22-20 @ 07:01  -  08-23-20 @ 07:00  --------------------------------------------------------  IN:    IV PiggyBack: 100 mL  Total IN: 100 mL    OUT:    Voided: 600 mL  Total OUT: 600 mL    Total NET: -500 mL          LABS:                          12.8   5.59  )-----------( 361      ( 23 Aug 2020 04:30 )             39.4                 12.8   5.59  )-----------( 361      ( 08-23 @ 04:30 )             39.4                12.7   8.21  )-----------( 167      ( 08-22 @ 22:59 )             38.8                13.0   8.53  )-----------( 300      ( 08-22 @ 10:45 )             39.3                14.1   9.18  )-----------( 300      ( 08-22 @ 07:20 )             43.3                                                  08-23    138  |  104  |  19  ----------------------------<  136<H>  4.4   |  24  |  0.7    Ca    9.2      23 Aug 2020 04:30  Phos  3.8     08-23  Mg     2.0     08-23    TPro  6.2  /  Alb  4.0  /  TBili  0.5  /  DBili  x   /  AST  23  /  ALT  17  /  AlkPhos  46  08-23      PT/INR - ( 22 Aug 2020 10:45 )   PT: 11.60 sec;   INR: 1.01 ratio         PTT - ( 22 Aug 2020 10:45 )  PTT:32.4 sec                                                                                     LIVER FUNCTIONS - ( 23 Aug 2020 04:30 )  Alb: 4.0 g/dL / Pro: 6.2 g/dL / ALK PHOS: 46 U/L / ALT: 17 U/L / AST: 23 U/L / GGT: x                                                                                                                                       X-Rays reviewed:                                                                                    ECHO    CXR interpreted by me:  SANTIAGO density     MEDICATIONS  (STANDING):  chlorhexidine 4% Liquid 1 Application(s) Topical <User Schedule>  doxycycline IVPB 100 milliGRAM(s) IV Intermittent every 12 hours  methylPREDNISolone sodium succinate Injectable 60 milliGRAM(s) IV Push every 12 hours  nicotine - 21 mG/24Hr(s) Patch 1 patch Transdermal daily  pantoprazole    Tablet 40 milliGRAM(s) Oral before breakfast    MEDICATIONS  (PRN):

## 2020-08-23 NOTE — PROGRESS NOTE ADULT - ASSESSMENT
74 yo F with Hx of smoking who presented to [facility] due to hemoptysis x1 day, workup was negative, sent home and told to return if any change. Returned due to increased hemoptysis and transferred from [facility].     #Hemoptysis   - Chest CT: patchy peribronchial groundglass opacities   - Not massive hemoptysis  - HD stable   - Monitor   - Hold home ASA   - Plan for possible bronchoscopy outpatient   - COVID-19 PCR negative   - c/w solumedrol  - c/w nebs q4  - c/w doxycycline  - F/u BCx   - F/u quantiferon   - F/u ESR, CRP   - Monitor CBC and coags     #Pulmonary nodule on CT chest   - F/u chest CT in 1 year    #Hx of smoking   - Active smoker   - Nicotine patch       Diet: Clears  GI PPx: Protonix  DVT: PPx: SCDs   Activity: As tolerated  Dispo: Stepdown unit   FULL CODE

## 2020-08-23 NOTE — PROGRESS NOTE ADULT - SUBJECTIVE AND OBJECTIVE BOX
SUBJECTIVE:    Patient is a 75y old Female who presents with a chief complaint of Hemoptysis (22 Aug 2020 16:56)    Currently admitted to medicine with the primary diagnosis of Major hemoptysis   HPI:  This is a 75 year old female with a significant smoking history who presented to the ED with a cc/o HEMOPTYSIS x1 day. On the morning of presentation, she awoke from her sleep with the feeling of phlegm in her throat, however, produced bloody bright red sputum. Her symptoms persisted and prompted her to go to the ED at [facility]. She was discharged as her workup was mostly unremarkable with advice to return if symptoms recurred. She eventually did return hours later as she began to produced larger amounts of bright red blood, and was transfered to [facility] for further workup. She has never had similar symptoms in the past. She reported travel to the [facility] in March 2020.     ROS was only positive for intermittent occipital headaches, and was otherwise negative. (22 Aug 2020 16:56)    Today is hospital day 1d. This morning she is resting comfortably in bed and reports no new issues or overnight events.     PAST MEDICAL & SURGICAL HISTORY  Diverticulosis  No significant past surgical history    SOCIAL HISTORY:  Negative for smoking/alcohol/drug use.     ALLERGIES:  No Known Allergies    MEDICATIONS:  STANDING MEDICATIONS  chlorhexidine 4% Liquid 1 Application(s) Topical <User Schedule>  doxycycline IVPB 100 milliGRAM(s) IV Intermittent every 12 hours  methylPREDNISolone sodium succinate Injectable 60 milliGRAM(s) IV Push every 12 hours  nicotine - 21 mG/24Hr(s) Patch 1 patch Transdermal daily  pantoprazole    Tablet 40 milliGRAM(s) Oral before breakfast    PRN MEDICATIONS    VITALS:   T(F): 96.5  HR: 62  BP: 129/61  RR: 13  SpO2: 97%    LABS:                        12.8   5.59  )-----------( 361      ( 23 Aug 2020 04:30 )             39.4     08-23    138  |  104  |  19  ----------------------------<  136<H>  4.4   |  24  |  0.7    Ca    9.2      23 Aug 2020 04:30  Phos  3.8     08-23  Mg     2.0     08-23    TPro  6.2  /  Alb  4.0  /  TBili  0.5  /  DBili  x   /  AST  23  /  ALT  17  /  AlkPhos  46  08-23    PT/INR - ( 22 Aug 2020 10:45 )   PT: 11.60 sec;   INR: 1.01 ratio         PTT - ( 22 Aug 2020 10:45 )  PTT:32.4 sec      Lactate, Blood: 0.8 mmol/L (08-23-20 @ 04:30)  Lactate, Blood: 0.7 mmol/L (08-22-20 @ 22:59)          RADIOLOGY:    Multislice helical sections were obtained from the thoracic inlet to the lung bases without intravenous contrast.Coronal, sagittal and 3D/MIP reformatted images are also submitted.    COMPARISON CT: None.    INTERPRETATION:    AIRWAYS, LUNGS AND PLEURA: Central airways are patent. Emphysema. Patchy peribronchial groundglass opacities in the medial right upper and middle lobes. 2 mm right lower lobe nodules (4/95 and 4/196) Biapical scarring. Additional scattered areas of linear atelectasis/scarring. No pleural effusion or pneumothorax.      MEDIASTINUM: There are no enlarged mediastinal, hilar or axillary lymph nodes. The visualized portion of the thyroid gland is unremarkable. Underdistended esophagus limiting evaluation.    HEART AND VESSELS: The heart is normal in size. There are aortic calcifications. The thoracic aorta has a normal caliber. There is no pericardial effusion.    UPPER ABDOMEN: Left renal cyst. Scattered pancreatic calcifications may represent sequela of chronic pancreatitis. Punctate calcified hepatic granuloma.    BONES AND SOFT TISSUES: Degenerative changes of the spine.      IMPRESSION:    1.  Patchy peribronchial groundglass opacities in the right upper and middle lobes, likely on the basis of small airway infectious/inflammatory process.  2.  2 mm right lower lobe pulmonary nodules. 1 year chest CT follow-up recommended.        PHYSICAL EXAM:  GEN: No acute distress  LUNGS: Clear to auscultation bilaterally   HEART: Regular  ABD: Soft, non-tender, non-distended.  EXT: NC/NC/NE/2+PP/SHEIKH/Skin Intact.   NEURO: AAOX3    Intravenous access:   NG tube:   Barnett Catheter:

## 2020-08-23 NOTE — CHART NOTE - NSCHARTNOTEFT_GEN_A_CORE
MICU Transfer Note    Transfer from: MICU  Transfer to:  (  ) Medicine    (  ) Telemetry    (  ) RCU    (  ) Palliative    (  ) Stroke Unit    (  ) _______________  Accepting physican:    HPI:  This is a 75 year old female with a significant smoking history who presented to the ED with a cc/o HEMOPTYSIS x1 day. On the morning of presentation, she awoke from her sleep with the feeling of phlegm in her throat, however, produced bloody bright red sputum. Her symptoms persisted and prompted her to go to the ED at [facility]. She was discharged as her workup was mostly unremarkable with advice to return if symptoms recurred. She eventually did return hours later as she began to produced larger amounts of bright red blood, and was transfered to [facility] for further workup. She has never had similar symptoms in the past. She reported travel to the [facility] in March 2020.     ROS was only positive for intermittent occipital headaches, and was otherwise negative. (22 Aug 2020 16:56)      MICU COURSE:  Patient was started on doxycycline, duonebs and solumedrol. Monitored for any episodes of hemoptysis. Hemodynamically stable for downgrade to the floors.       ASSESSMENT & PLAN:   76 yo F with Hx of smoking who presented to [facility] due to hemoptysis x1 day, workup was negative, sent home and told to return if any change. Returned due to increased hemoptysis and transferred from [facility].     #Hemoptysis   - Chest CT: patchy peribronchial groundglass opacities   - Not massive hemoptysis  - HD stable   - Monitor   - Hold home ASA   - Plan for possible bronchoscopy outpatient   - COVID-19 PCR negative   - c/w solumedrol  - c/w nebs q4  - c/w doxycycline  - F/u BCx   - F/u quantiferon   - F/u ESR, CRP   - Monitor CBC and coags     #Pulmonary nodule on CT chest   - F/u chest CT in 1 year    #Hx of smoking   - Active smoker   - Nicotine patch       Diet: Clears  GI PPx: Protonix  DVT: PPx: SCDs   Activity: As tolerated  Dispo: Stepdown unit   FULL CODE      For Follow-Up:  BCx, quantiferon, ESR, CRP   Monitor CBC and coags       Vital Signs Last 24 Hrs  T(C): 35.8 (23 Aug 2020 12:00), Max: 35.9 (22 Aug 2020 18:00)  T(F): 96.4 (23 Aug 2020 12:00), Max: 96.6 (22 Aug 2020 18:00)  HR: 80 (23 Aug 2020 16:00) (58 - 80)  BP: 108/73 (23 Aug 2020 15:00) (95/66 - 154/73)  BP(mean): 90 (23 Aug 2020 15:00) (64 - 108)  RR: 32 (23 Aug 2020 16:00) (12 - 33)  SpO2: 96% (23 Aug 2020 16:00) (95% - 98%)  I&O's Summary    22 Aug 2020 07:01  -  23 Aug 2020 07:00  --------------------------------------------------------  IN: 100 mL / OUT: 600 mL / NET: -500 mL    23 Aug 2020 07:01  -  23 Aug 2020 16:49  --------------------------------------------------------  IN: 0 mL / OUT: 550 mL / NET: -550 mL          MEDICATIONS  (STANDING):  albuterol/ipratropium for Nebulization 3 milliLiter(s) Nebulizer every 4 hours  chlorhexidine 4% Liquid 1 Application(s) Topical <User Schedule>  doxycycline IVPB 100 milliGRAM(s) IV Intermittent every 12 hours  methylPREDNISolone sodium succinate Injectable 60 milliGRAM(s) IV Push every 12 hours  nicotine - 21 mG/24Hr(s) Patch 1 patch Transdermal daily  pantoprazole    Tablet 40 milliGRAM(s) Oral before breakfast    MEDICATIONS  (PRN):  albuterol/ipratropium for Nebulization 3 milliLiter(s) Nebulizer every 6 hours PRN Shortness of Breath and/or Wheezing        LABS                                            12.8                  Neurophils% (auto):   79.0   (08-23 @ 04:30):    5.59 )-----------(361          Lymphocytes% (auto):  18.4                                          39.4                   Eosinphils% (auto):   0.0      Manual%: Neutrophils x    ; Lymphocytes x    ; Eosinophils x    ; Bands%: x    ; Blasts x                                    138    |  104    |  19                  Calcium: 9.2   / iCa: x      (08-23 @ 04:30)    ----------------------------<  136       Magnesium: 2.0                              4.4     |  24     |  0.7              Phosphorous: 3.8      TPro  6.2    /  Alb  4.0    /  TBili  0.5    /  DBili  x      /  AST  23     /  ALT  17     /  AlkPhos  46     23 Aug 2020 04:30

## 2020-08-23 NOTE — PROGRESS NOTE ADULT - SUBJECTIVE AND OBJECTIVE BOX
SUBJECTIVE:    Patient is a 75y old Female who presents with a chief complaint of Hemoptysis (23 Aug 2020 08:32)    Currently admitted to medicine with the primary diagnosis of Major hemoptysis     Today is hospital day 1d. This morning she is resting comfortably in bed. No acute events overnight.     PAST MEDICAL & SURGICAL HISTORY  Diverticulosis  No significant past surgical history    SOCIAL HISTORY:  Negative for smoking/alcohol/drug use.     ALLERGIES:  No Known Allergies    MEDICATIONS:  STANDING MEDICATIONS  albuterol/ipratropium for Nebulization 3 milliLiter(s) Nebulizer every 4 hours  chlorhexidine 4% Liquid 1 Application(s) Topical <User Schedule>  doxycycline IVPB 100 milliGRAM(s) IV Intermittent every 12 hours  methylPREDNISolone sodium succinate Injectable 60 milliGRAM(s) IV Push every 12 hours  nicotine - 21 mG/24Hr(s) Patch 1 patch Transdermal daily  pantoprazole    Tablet 40 milliGRAM(s) Oral before breakfast    PRN MEDICATIONS  albuterol/ipratropium for Nebulization 3 milliLiter(s) Nebulizer every 6 hours PRN    VITALS:   T(F): 96.4  HR: 74  BP: 112/65  RR: 33  SpO2: 97%    PHYSICAL EXAM:  GEN: NAD, lying in bed comfortably  HEENT: Normocephalic, atraumatic EOMI, PERRL  LUNGS: Clear to auscultation bilaterally.   HEART: S1/S2 present. RRR.   ABD: Soft, non-tender, non-distended. Bowel sounds present.  EXT: 2+ peripheral pulses. No edema  NEURO: AAOX3. Speech clear. No focal neurological deficits. Sensation grossly intact.     LABS:                        12.8   5.59  )-----------( 361      ( 23 Aug 2020 04:30 )             39.4     08-23    138  |  104  |  19  ----------------------------<  136<H>  4.4   |  24  |  0.7    Ca    9.2      23 Aug 2020 04:30  Phos  3.8     08-23  Mg     2.0     08-23    TPro  6.2  /  Alb  4.0  /  TBili  0.5  /  DBili  x   /  AST  23  /  ALT  17  /  AlkPhos  46  08-23    PT/INR - ( 22 Aug 2020 10:45 )   PT: 11.60 sec;   INR: 1.01 ratio         PTT - ( 22 Aug 2020 10:45 )  PTT:32.4 sec      Sedimentation Rate, Erythrocyte: 12 mm/Hr (08-23-20 @ 09:38)  Lactate, Blood: 0.8 mmol/L (08-23-20 @ 04:30)  Lactate, Blood: 0.7 mmol/L (08-22-20 @ 22:59)      RADIOLOGY:  < from: Xray Chest 2 Views PA/Lat (08.22.20 @ 07:37) >  EXAM:  XR CHEST PA LAT 2V          PROCEDURE DATE:  08/22/2020      INTERPRETATION:  Clinical History / Reason for exam: Hemoptysis    Comparison : Chest radiograph November 26, 2008.    Technique/Positioning: PA and lateral chest x-rays obtained..    Findings:    Support devices: None.    Cardiac/mediastinum/hilum: Unremarkable.    Lung parenchyma/Pleura: Patchy peribronchial groundglass opacities in the right upper and middle lobes better seen on CT from same date.    Skeleton/soft tissues: Degenerative change, thoracic spine.    Impression:    Patchy peribronchial groundglass opacities in the right upper and middle lobes better seen on CT from same date.    ROLLY LAURA M.D., ATTENDING RADIOLOGIST  This document has been electronically signed. Aug 22 2020  2:10PM  < end of copied text >    < from: CT Chest w/ IV Cont (08.22.20 @ 12:03) >  EXAM:  CT CHEST IC          PROCEDURE DATE:  08/22/2020      INTERPRETATION:  CLINICAL INDICATION: Hemoptysis    TECHNIQUE:    Multislice helical sections were obtained from the thoracic inlet to the lung bases without intravenous contrast.Coronal, sagittal and 3D/MIP reformatted images are also submitted.    COMPARISON CT: None.    INTERPRETATION:    AIRWAYS, LUNGS AND PLEURA: Central airways are patent. Emphysema. Patchy peribronchial groundglass opacities in the medial right upper and middle lobes. 2 mm right lower lobe nodules (4/95 and 4/196) Biapical scarring. Additional scattered areas of linear atelectasis/scarring. No pleural effusion or pneumothorax.      MEDIASTINUM: There are no enlarged mediastinal, hilar or axillary lymph nodes. The visualized portion of the thyroid gland is unremarkable. Underdistended esophagus limiting evaluation.    HEART AND VESSELS: The heart is normal in size. There are aortic calcifications. The thoracic aorta has a normal caliber. There is no pericardial effusion.    UPPER ABDOMEN: Left renal cyst. Scattered pancreatic calcifications may represent sequela of chronic pancreatitis. Punctate calcified hepatic granuloma.    BONES AND SOFT TISSUES: Degenerative changes of the spine.    IMPRESSION:    1.  Patchy peribronchial groundglass opacities in the right upper and middle lobes, likely on the basis of small airway infectious/inflammatory process.  2.  2 mm right lower lobe pulmonary nodules. 1 year chest CT follow-up recommended.    MASSIEL ESTRADA M.D., RESIDENT RADIOLOGIST  This document has been electronically signed.  JAY BLAS M.D., ATTENDING RADIOLOGIST  This document has been electronically signed. Aug 22 2020  1:11PM  < end of copied text >    < from: Xray Chest 1 View- PORTABLE-Routine (08.23.20 @ 06:18) >  EXAM:  XR CHEST PORTABLE ROUTINE 1V          PROCEDURE DATE:  08/23/2020        INTERPRETATION:  Clinical History/Reason For Exam: BL ground glass opacities    Comparison : Chest radiograph 8/22/2020.    Technique: XR CHEST    Findings:    Support devices: None.    Cardiac/mediastinum/hilum: Unremarkable.    Lung parenchyma/Pleura: Hyperinflation. Biapical scarring. No focal consolidation, effusion, or pneumothorax.    Skeleton/soft tissues: Unremarkable.    Impression:    Hyperinflation.Biapical scarring. No focal consolidation, effusion, or pneumothorax.    BRITTNEE MARIN M.D., ATTENDING RADIOLOGIST  This document has been electronically signed. Aug 23 2020  9:29AM    < end of copied text >

## 2020-08-23 NOTE — PROGRESS NOTE ADULT - ASSESSMENT
Hemoptysis HGB stable   RUL & RML ground glass opacities  lung nodule     plan    bronchoscopy today    ICU admit    IV doxycycline    Protonix IV    IR on Board    disposition active    spoke w pt and resident     [id number]  [id number]

## 2020-08-23 NOTE — CONSULT NOTE ADULT - ASSESSMENT
IMPRESSION:    Hemoptysis ( non massive) stable hemodynamically s/p chest ct, Resolving   Active smoker  Possible COPD       PLAN:    CNS: Avoid CNS depressant    HEENT:  Oral care    PULMONARY:  HOB @ 45 degrees, aspiration precaution, Continue Solumedrol.  Nebs Q4 and PRN.  Continue ABX.  Possible bronc next week based on her progress     CARDIOVASCULAR:  I=O.      GI: GI prophylaxis                                          Feeding Clears.      RENAL:  F/u  lytes.  Correct as needed. FU Lytes.      INFECTIOUS DISEASE: Doxy 100 q 12h.  FU Cultures.      HEMATOLOGICAL:  DVT prophylaxis. FU Coags.      ENDOCRINE:  Follow up FS.  Insulin protocol if needed.  ESR and CRP.      CODE STATUS: FULL CODE    Step down unit

## 2020-08-24 VITALS — DIASTOLIC BLOOD PRESSURE: 73 MMHG | SYSTOLIC BLOOD PRESSURE: 159 MMHG | HEART RATE: 64 BPM

## 2020-08-24 LAB
ALBUMIN SERPL ELPH-MCNC: 4.1 G/DL — SIGNIFICANT CHANGE UP (ref 3.5–5.2)
ALP SERPL-CCNC: 44 U/L — SIGNIFICANT CHANGE UP (ref 30–115)
ALT FLD-CCNC: 16 U/L — SIGNIFICANT CHANGE UP (ref 0–41)
ANION GAP SERPL CALC-SCNC: 12 MMOL/L — SIGNIFICANT CHANGE UP (ref 7–14)
APTT BLD: 26.2 SEC — LOW (ref 27–39.2)
AST SERPL-CCNC: 23 U/L — SIGNIFICANT CHANGE UP (ref 0–41)
BASOPHILS # BLD AUTO: 0.03 K/UL — SIGNIFICANT CHANGE UP (ref 0–0.2)
BASOPHILS NFR BLD AUTO: 0.3 % — SIGNIFICANT CHANGE UP (ref 0–1)
BILIRUB SERPL-MCNC: 0.6 MG/DL — SIGNIFICANT CHANGE UP (ref 0.2–1.2)
BUN SERPL-MCNC: 22 MG/DL — HIGH (ref 10–20)
CALCIUM SERPL-MCNC: 9.3 MG/DL — SIGNIFICANT CHANGE UP (ref 8.5–10.1)
CHLORIDE SERPL-SCNC: 104 MMOL/L — SIGNIFICANT CHANGE UP (ref 98–110)
CO2 SERPL-SCNC: 23 MMOL/L — SIGNIFICANT CHANGE UP (ref 17–32)
CREAT SERPL-MCNC: 0.7 MG/DL — SIGNIFICANT CHANGE UP (ref 0.7–1.5)
CRP SERPL-MCNC: 0.11 MG/DL — SIGNIFICANT CHANGE UP (ref 0–0.4)
EOSINOPHIL # BLD AUTO: 0.02 K/UL — SIGNIFICANT CHANGE UP (ref 0–0.7)
EOSINOPHIL NFR BLD AUTO: 0.2 % — SIGNIFICANT CHANGE UP (ref 0–8)
GLUCOSE SERPL-MCNC: 106 MG/DL — HIGH (ref 70–99)
HCT VFR BLD CALC: 38.1 % — SIGNIFICANT CHANGE UP (ref 37–47)
HCV AB S/CO SERPL IA: 0.24 COI — SIGNIFICANT CHANGE UP
HCV AB SERPL-IMP: SIGNIFICANT CHANGE UP
HGB BLD-MCNC: 12.5 G/DL — SIGNIFICANT CHANGE UP (ref 12–16)
IMM GRANULOCYTES NFR BLD AUTO: 0.3 % — SIGNIFICANT CHANGE UP (ref 0.1–0.3)
INR BLD: 1.07 RATIO — SIGNIFICANT CHANGE UP (ref 0.65–1.3)
LYMPHOCYTES # BLD AUTO: 1.33 K/UL — SIGNIFICANT CHANGE UP (ref 1.2–3.4)
LYMPHOCYTES # BLD AUTO: 13.5 % — LOW (ref 20.5–51.1)
MAGNESIUM SERPL-MCNC: 2.2 MG/DL — SIGNIFICANT CHANGE UP (ref 1.8–2.4)
MCHC RBC-ENTMCNC: 31 PG — SIGNIFICANT CHANGE UP (ref 27–31)
MCHC RBC-ENTMCNC: 32.8 G/DL — SIGNIFICANT CHANGE UP (ref 32–37)
MCV RBC AUTO: 94.5 FL — SIGNIFICANT CHANGE UP (ref 81–99)
MONOCYTES # BLD AUTO: 0.17 K/UL — SIGNIFICANT CHANGE UP (ref 0.1–0.6)
MONOCYTES NFR BLD AUTO: 1.7 % — SIGNIFICANT CHANGE UP (ref 1.7–9.3)
NEUTROPHILS # BLD AUTO: 8.25 K/UL — HIGH (ref 1.4–6.5)
NEUTROPHILS NFR BLD AUTO: 84 % — HIGH (ref 42.2–75.2)
NRBC # BLD: 0 /100 WBCS — SIGNIFICANT CHANGE UP (ref 0–0)
PLATELET # BLD AUTO: 184 K/UL — SIGNIFICANT CHANGE UP (ref 130–400)
POTASSIUM SERPL-MCNC: 4.4 MMOL/L — SIGNIFICANT CHANGE UP (ref 3.5–5)
POTASSIUM SERPL-SCNC: 4.4 MMOL/L — SIGNIFICANT CHANGE UP (ref 3.5–5)
PROT SERPL-MCNC: 6.4 G/DL — SIGNIFICANT CHANGE UP (ref 6–8)
PROTHROM AB SERPL-ACNC: 12.3 SEC — SIGNIFICANT CHANGE UP (ref 9.95–12.87)
RBC # BLD: 4.03 M/UL — LOW (ref 4.2–5.4)
RBC # FLD: 13.6 % — SIGNIFICANT CHANGE UP (ref 11.5–14.5)
SODIUM SERPL-SCNC: 139 MMOL/L — SIGNIFICANT CHANGE UP (ref 135–146)
WBC # BLD: 9.83 K/UL — SIGNIFICANT CHANGE UP (ref 4.8–10.8)
WBC # FLD AUTO: 9.83 K/UL — SIGNIFICANT CHANGE UP (ref 4.8–10.8)

## 2020-08-24 RX ADMIN — Medication 60 MILLIGRAM(S): at 05:01

## 2020-08-24 RX ADMIN — Medication 110 MILLIGRAM(S): at 05:00

## 2020-08-24 NOTE — DISCHARGE NOTE PROVIDER - NSDCQMCOGNITION_NEU_ALL_CORE
No difficulties [FreeTextEntry1] : Physical/ pain in pelvis had fracture in the past [de-identified] : [employer] works in [place] works 2 days out of week 16 hour Shift\par fractured pelvis right in 2009 while exercising/stress fracture lately has pain on and off. pain is worse 2 weeks ago and limping and rested now its better.

## 2020-08-24 NOTE — DISCHARGE NOTE PROVIDER - NSDCMRMEDTOKEN_GEN_ALL_CORE_FT
aspirin 81 mg oral tablet: 1 tab(s) orally once a day  predniSONE 20 mg oral tablet: 2 tab(s) orally once a day

## 2020-08-24 NOTE — DISCHARGE NOTE PROVIDER - CARE PROVIDER_API CALL
Juni Abernathy)  Critical Care Medicine; Pulmonary Disease; Sleep Medicine  [address] [place] [address]  Phone: [phone]  Fax: [phone]  Follow Up Time: 1 week    Wagner Hardin  INTERNAL MEDICINE  [address] [place] [address]  Phone: [phone]  Fax: [phone]  Follow Up Time: 1 week

## 2020-08-24 NOTE — DISCHARGE NOTE NURSING/CASE MANAGEMENT/SOCIAL WORK - PATIENT PORTAL LINK FT
You can access the FollowMyHealth Patient Portal offered by [facility] by registering at the following website: [email or web address]. By joining [facility]’s FollowMyHealth portal, you will also be able to view your health information using other applications (apps) compatible with our system.

## 2020-08-24 NOTE — DISCHARGE NOTE PROVIDER - HOSPITAL COURSE
This is a 75 year old female with a significant smoking history who presented to the ED with a cc/o HEMOPTYSIS x1 day. On the morning of presentation, she awoke from her sleep with the feeling of phlegm in her throat, however, produced bloody bright red sputum. Her symptoms persisted and prompted her to go to the ED at [facility]. She was discharged as her workup was mostly unremarkable with advice to return if symptoms recurred. She eventually did return hours later as she began to produced larger amounts of bright red blood, and was transfered to [facility] for further workup. She has never had similar symptoms in the past. She reported travel to the [facility] in March 2020.         She was admitted initially to ICU and monitored for hemoptysis    Ct chest : patchy bilateral peribronchial ground glass opacity in RU and middle lobe with 2 mm lung nodule in RLL    Started on steroid and doxycycline She also has received rocephine and azithro    Quantiferon was sent    Patient was downgraded to CEU    Stable HB     Hemodynamically stable     She will be discharged and FU as outpatient for bronchoscopy    Taper steroid at home

## 2020-08-24 NOTE — CHART NOTE - NSCHARTNOTEFT_GEN_A_CORE
<<<RESIDENT DISCHARGE NOTE>>>     RALPH BENAVIDEZ  MRN-[id number]    VITAL SIGNS:  T(F): 97 (08-24-20 @ 06:10), Max: 97 (08-23-20 @ 18:54)  HR: 64 (08-24-20 @ 06:45)  BP: 159/73 (08-24-20 @ 06:45)  SpO2: 98% (08-24-20 @ 06:10)      PHYSICAL EXAMINATION:  General: NAD  Head & Neck: normal oral mucosa, no sinus tenderness, well injected conjunctiva  Pulmonary: good bilateral air entry, scattered inspiratory rales  Cardiovascular: Regular rate and rhythm no audible murmur  Gastrointestinal/Abdomen & Pelvis: soft non tender positive bowel sounds no organomegaly  Neurologic/Motor: grossly intact  skin right form arm hematoma , no evidence of phlebitis    TEST RESULTS:                        12.5   9.83  )-----------( 184      ( 24 Aug 2020 07:37 )             38.1       08-24    139  |  104  |  22<H>  ----------------------------<  106<H>  4.4   |  23  |  0.7    Ca    9.3      24 Aug 2020 07:37  Phos  3.8     08-23  Mg     2.2     08-24    TPro  6.4  /  Alb  4.1  /  TBili  0.6  /  DBili  x   /  AST  23  /  ALT  16  /  AlkPhos  44  08-24      FINAL DISCHARGE INTERVIEW:  Resident(s) Present: (Name:_Dr. Mayorga ), RN Present: (Name: RN. mini_)    DISCHARGE MEDICATION RECONCILIATION  reviewed with Attending (Name:_Dr. Escobedo)    DISPOSITION:   [ X] Home,    [  ] Home with Visiting Nursing Services,   [    ]  SNF/ NH,    [   ] Acute Rehab (4A),   [   ] Other (Specify:_________)

## 2020-08-24 NOTE — DISCHARGE NOTE PROVIDER - NSDCCPCAREPLAN_GEN_ALL_CORE_FT
PRINCIPAL DISCHARGE DIAGNOSIS  Diagnosis: Major hemoptysis  Assessment and Plan of Treatment: You were admitted to the hospital because of coughing up blood, that can have multiple reasons, including infections, inflammation, etc. You need to follow with pulmonary Dr. Abernathy, to schedule a bronchoscopy for a definite diagnosis. Meanwhile, continue to take prednisone as prescribed.      SECONDARY DISCHARGE DIAGNOSES  Diagnosis: Ground glass opacity present on imaging of lung  Assessment and Plan of Treatment:

## 2020-08-24 NOTE — PROGRESS NOTE ADULT - SUBJECTIVE AND OBJECTIVE BOX
Patient was seen and examined. Spoke with HO. Chart reviewed.  No events overnight. No hemoptysis or other complaints  Vital Signs Last 24 Hrs  T(F): 97 (24 Aug 2020 06:10), Max: 97 (23 Aug 2020 18:54)  HR: 64 (24 Aug 2020 06:45) (64 - 80)  BP: 159/73 (24 Aug 2020 06:45) (95/66 - 170/80)  SpO2: 98% (24 Aug 2020 06:10) (96% - 98%)  MEDICATIONS  (STANDING):  albuterol/ipratropium for Nebulization 3 milliLiter(s) Nebulizer every 4 hours  chlorhexidine 4% Liquid 1 Application(s) Topical <User Schedule>  doxycycline IVPB 100 milliGRAM(s) IV Intermittent every 12 hours  methylPREDNISolone sodium succinate Injectable 60 milliGRAM(s) IV Push every 12 hours  nicotine - 21 mG/24Hr(s) Patch 1 patch Transdermal daily  pantoprazole    Tablet 40 milliGRAM(s) Oral before breakfast    MEDICATIONS  (PRN):  albuterol/ipratropium for Nebulization 3 milliLiter(s) Nebulizer every 6 hours PRN Shortness of Breath and/or Wheezing  diphenhydrAMINE 25 milliGRAM(s) Oral every 6 hours PRN Rash and/or Itching    Labs:                        12.2   10.31 )-----------( 176      ( 23 Aug 2020 16:45 )             36.5                         12.8   5.59  )-----------( 361      ( 23 Aug 2020 04:30 )             39.4     24 Aug 2020 07:37    139    |  104    |  22     ----------------------------<  106    4.4     |  23     |  0.7    23 Aug 2020 04:30    138    |  104    |  19     ----------------------------<  136    4.4     |  24     |  0.7      Ca    9.3        24 Aug 2020 07:37  Ca    9.2        23 Aug 2020 04:30  Phos  3.8       23 Aug 2020 04:30  Mg     2.2       24 Aug 2020 07:37  Mg     2.0       23 Aug 2020 04:30    TPro  6.4    /  Alb  4.1    /  TBili  0.6    /  DBili  x      /  AST  23     /  ALT  16     /  AlkPhos  44     24 Aug 2020 07:37  TPro  6.2    /  Alb  4.0    /  TBili  0.5    /  DBili  x      /  AST  23     /  ALT  17     /  AlkPhos  46     23 Aug 2020 04:30    PT/INR - ( 24 Aug 2020 07:37 )   PT: 12.30 sec;   INR: 1.07 ratio         PTT - ( 24 Aug 2020 07:37 )  PTT:26.2 sec      Culture - Blood (collected 22 Aug 2020 22:59)  Source: .Blood None  Preliminary Report (24 Aug 2020 07:01):    No growth to date.      General: comfortable, NAD  Neurology: A&Ox3, nonfocal  Head:  Normocephalic, atraumatic  ENT:  Mucosa moist, no ulcerations  Neck:  Supple, no JVD,   Skin: no breakdowns (as per RN)  Resp: CTA B/L  CV: RRR, S1S2,   GI: Soft, NT, bowel sounds  MS: No edema, + peripheral pulses, FROM all 4 extremity      A/P:  74 yo F with Hx of smoking admitted with hemoptysis x1 day, workup was negative    cleared by pulm today for discharge home on PO prednsiione and follow up as outpt for possible outpt bronch    #Pulmonary nodule on CT chest   - F/u chest CT in 1 year    #Hx of smoking   - Active smoker   - Nicotine patch    DC home today    outpt pulm and ENt f/u    return to ER if hemoptysis recurs     DVT prophylaxis  Decubitus prevention- all measures as per RN protocol  Please call or text me with any questions or updates

## 2020-08-24 NOTE — DISCHARGE NOTE NURSING/CASE MANAGEMENT/SOCIAL WORK - NSDCPEEMAIL_GEN_ALL_CORE
[facility] for Tobacco Control email [email or web address]

## 2020-08-24 NOTE — DISCHARGE NOTE NURSING/CASE MANAGEMENT/SOCIAL WORK - NSDCPEWEB_GEN_ALL_CORE
[facility] for Tobacco Control website --- [email or web address] website --- [email or web address]

## 2020-08-25 LAB
GAMMA INTERFERON BACKGROUND BLD IA-ACNC: 0.01 IU/ML — SIGNIFICANT CHANGE UP
M TB IFN-G BLD-IMP: ABNORMAL
M TB IFN-G CD4+ BCKGRND COR BLD-ACNC: 0 IU/ML — SIGNIFICANT CHANGE UP
M TB IFN-G CD4+CD8+ BCKGRND COR BLD-ACNC: 0 IU/ML — SIGNIFICANT CHANGE UP
QUANT TB PLUS MITOGEN MINUS NIL: 0.29 IU/ML — SIGNIFICANT CHANGE UP

## 2020-08-28 LAB
CULTURE RESULTS: SIGNIFICANT CHANGE UP
SPECIMEN SOURCE: SIGNIFICANT CHANGE UP

## 2020-09-02 PROBLEM — K57.90 DIVERTICULOSIS OF INTESTINE, PART UNSPECIFIED, WITHOUT PERFORATION OR ABSCESS WITHOUT BLEEDING: Chronic | Status: ACTIVE | Noted: 2020-08-22

## 2020-09-21 ENCOUNTER — OUTPATIENT (OUTPATIENT)
Dept: OUTPATIENT SERVICES | Facility: HOSPITAL | Age: 75
LOS: 1 days | Discharge: HOME | End: 2020-09-21

## 2020-09-21 DIAGNOSIS — Z11.59 ENCOUNTER FOR SCREENING FOR OTHER VIRAL DISEASES: ICD-10-CM

## 2020-09-24 ENCOUNTER — OUTPATIENT (OUTPATIENT)
Dept: OUTPATIENT SERVICES | Facility: HOSPITAL | Age: 75
LOS: 1 days | Discharge: HOME | End: 2020-09-24
Payer: MEDICARE

## 2020-09-24 VITALS — DIASTOLIC BLOOD PRESSURE: 61 MMHG | SYSTOLIC BLOOD PRESSURE: 135 MMHG | RESPIRATION RATE: 18 BRPM | HEART RATE: 70 BPM

## 2020-09-24 VITALS
TEMPERATURE: 98 F | HEIGHT: 66 IN | HEART RATE: 72 BPM | WEIGHT: 113.98 LBS | RESPIRATION RATE: 18 BRPM | DIASTOLIC BLOOD PRESSURE: 69 MMHG | SYSTOLIC BLOOD PRESSURE: 138 MMHG

## 2020-09-24 PROCEDURE — 88112 CYTOPATH CELL ENHANCE TECH: CPT | Mod: 26

## 2020-09-24 PROCEDURE — 88305 TISSUE EXAM BY PATHOLOGIST: CPT | Mod: 26

## 2020-09-24 PROCEDURE — 88312 SPECIAL STAINS GROUP 1: CPT | Mod: 26

## 2020-09-25 LAB
GRAM STN FLD: SIGNIFICANT CHANGE UP
NIGHT BLUE STAIN TISS: SIGNIFICANT CHANGE UP
NON-GYNECOLOGICAL CYTOLOGY STUDY: SIGNIFICANT CHANGE UP
SPECIMEN SOURCE: SIGNIFICANT CHANGE UP
SPECIMEN SOURCE: SIGNIFICANT CHANGE UP

## 2020-09-26 LAB
CULTURE RESULTS: SIGNIFICANT CHANGE UP
SPECIMEN SOURCE: SIGNIFICANT CHANGE UP

## 2020-09-28 DIAGNOSIS — Z79.82 LONG TERM (CURRENT) USE OF ASPIRIN: ICD-10-CM

## 2020-09-28 DIAGNOSIS — F17.210 NICOTINE DEPENDENCE, CIGARETTES, UNCOMPLICATED: ICD-10-CM

## 2020-09-28 DIAGNOSIS — R04.2 HEMOPTYSIS: ICD-10-CM

## 2020-10-02 LAB — MISCELLANEOUS TEST NAME: SIGNIFICANT CHANGE UP

## 2020-10-24 LAB
CULTURE RESULTS: SIGNIFICANT CHANGE UP
SPECIMEN SOURCE: SIGNIFICANT CHANGE UP

## 2020-11-14 LAB
CULTURE RESULTS: SIGNIFICANT CHANGE UP
SPECIMEN SOURCE: SIGNIFICANT CHANGE UP

## 2020-11-23 ENCOUNTER — OUTPATIENT (OUTPATIENT)
Dept: OUTPATIENT SERVICES | Facility: HOSPITAL | Age: 75
LOS: 1 days | Discharge: HOME | End: 2020-11-23
Payer: MEDICARE

## 2020-11-23 DIAGNOSIS — R06.02 SHORTNESS OF BREATH: ICD-10-CM

## 2020-11-23 PROCEDURE — 71250 CT THORAX DX C-: CPT | Mod: 26

## 2022-02-10 NOTE — PATIENT PROFILE ADULT - NSPROPTRIGHTCAREGIVER_GEN_A_NUR
GENERAL SURGERY PROGRESS NOTE     CC: Recurrent Psbo     INTERVAL HISTORY: He states loose stools after the gastrografin. Passing flatus. His abdominal pain is mild. His chronic pain is the worst pain he has. He is questioning if  was notified about his recurrent SBO.    REVIEW OF SYSTEMS    CONSTITUTIONAL: Denies - fever and chills.   CARDIOVASCuLAR: Denies - chest pain, palpitations and edema.   RESPIRATORY: Denies - shortness of breath and cough.    Pertinent past history  Past Medical History:   Diagnosis Date   • ADD (attention deficit disorder with hyperactivity)    • Anxiety    • Chronic pain 2015    neck and arm   • Depression    • Diverticulosis    • Esophageal reflux    • Essential (primary) hypertension    • Fracture     hand and arm   • GERD (gastroesophageal reflux disease)    • High cholesterol    • Other chronic pain     back--lower and bilateral lower extremities   • PONV (postoperative nausea and vomiting) 2011    following R shoulder surgery \"thinks it was d/t pain med post op\"   • Psoriasis    • Seizure (CMS/HCC) 2000    14 yrs ago x1, and at age 5 x1   • Unspecified sinusitis (chronic)      Past Surgical History:   Procedure Laterality Date   • Abdomen surgery      x9 per pt (multiple colon surgeries and hernia repairs)   • Appendectomy      with 1st colon sugery   • Back surgery  10/23/2008    lumbar microdiscectomy   • Back surgery  2/2013    lumbar fusion    • Back surgery  3/2013    lumbar hardware removal   • Back surgery  05/16/2018    anterior lumbar fusion   • Cervical fusion     • Colon surgery  2/2014    COLON RESECTION/DIVERTICULITIS X2   • Hernia repair  02/19/2012    incarcerated VIH   • Hernia repair      MESH PLACEMENT & REMOVAL   • Inguinal hernia repair      multiple inguinal hernias per pt   • Neck surgery      x3   • Shoulder arthroscopy  2011    right shoulder surg and rotator cuff repair (x3 total per pt)   • Throat surgery  2004    cyst removed   • Ventral hernia  repair  12/26/2020    with lysis of adhesions         PHYSICAL EXAM:   Constitutional:   Visit Vitals  BP (!) 142/92 (BP Location: RUE - Right upper extremity, Patient Position: Semi-Salvador's)   Pulse 83   Temp 98.8 °F (37.1 °C) (Oral)   Resp 16   Ht 6' 1\" (1.854 m)   Wt 117.5 kg (259 lb 0.7 oz)   SpO2 94%   BMI 34.18 kg/m²    -appears comfortable -obese body habitus  Resp: -Normal effort -normal breath sounds bilaterally  CV: -Regular rate and rhythm -no lower extremity edema  GI: Soft, non-tender, no rebound or guarding and non-distended, hypoactive bowel sounds, incision intact without hernia   Extremity: BUE radial pulse +2   Psych: -Oriented to person, place, and time -Normal mood and affect    Laboratory Results:  WBC (K/mcL)   Date Value   02/09/2022 6.9     HGB (g/dL)   Date Value   02/09/2022 14.4     PLT (K/mcL)   Date Value   02/09/2022 187     Creatinine (mg/dL)   Date Value   02/09/2022 0.97     INR (sec)   Date Value   07/09/2020 1.0     No results found for: HGBA1C      Imaging     XR ABDOMEN 2 Vw Results for orders placed during the hospital encounter of 02/05/22    XR Abdomen 2 Vw    Impression  Contrast is present throughout the colon without abnormal small  bowel loops.    ASSESSMENT:  Recurrent SBO- Hypoactive bowel sounds.  NG was discontinued. Passing flatus. Improved.     PLAN:    · Transition diet  · Stool regimen   · Dr. Finley was messaged  · No emergent surgical intervention      Discussed with: Dr. Lopez, Patient and Nurse    Oscar LO  General Vascular Surgery  Pager [phone]     Patient's abdomen is soft and nontender.  Tolerating clear liquids.  Moving his bowels.    Advance diet as tolerated.    Decrease IV fluids       declines

## 2022-09-06 ENCOUNTER — OUTPATIENT (OUTPATIENT)
Dept: OUTPATIENT SERVICES | Facility: HOSPITAL | Age: 77
LOS: 1 days | Discharge: HOME | End: 2022-09-06

## 2022-09-06 DIAGNOSIS — R91.8 OTHER NONSPECIFIC ABNORMAL FINDING OF LUNG FIELD: ICD-10-CM

## 2022-09-06 PROCEDURE — 71250 CT THORAX DX C-: CPT | Mod: 26,MH

## 2022-10-19 ENCOUNTER — INPATIENT (INPATIENT)
Facility: HOSPITAL | Age: 77
LOS: 1 days | Discharge: HOME | End: 2022-10-21
Attending: SURGERY | Admitting: SURGERY

## 2022-10-19 ENCOUNTER — EMERGENCY (EMERGENCY)
Facility: HOSPITAL | Age: 77
LOS: 0 days | Discharge: NURSING, REHAB OR LONG-TERM CARE | End: 2022-10-20
Admitting: EMERGENCY MEDICINE

## 2022-10-19 VITALS
HEIGHT: 66 IN | WEIGHT: 108.03 LBS | DIASTOLIC BLOOD PRESSURE: 70 MMHG | SYSTOLIC BLOOD PRESSURE: 160 MMHG | HEART RATE: 63 BPM | RESPIRATION RATE: 18 BRPM | TEMPERATURE: 96 F | OXYGEN SATURATION: 98 %

## 2022-10-19 DIAGNOSIS — Z79.82 LONG TERM (CURRENT) USE OF ASPIRIN: ICD-10-CM

## 2022-10-19 DIAGNOSIS — N20.0 CALCULUS OF KIDNEY: ICD-10-CM

## 2022-10-19 DIAGNOSIS — R10.11 RIGHT UPPER QUADRANT PAIN: ICD-10-CM

## 2022-10-19 DIAGNOSIS — K57.30 DIVERTICULOSIS OF LARGE INTESTINE WITHOUT PERFORATION OR ABSCESS WITHOUT BLEEDING: ICD-10-CM

## 2022-10-19 DIAGNOSIS — K80.00 CALCULUS OF GALLBLADDER WITH ACUTE CHOLECYSTITIS WITHOUT OBSTRUCTION: ICD-10-CM

## 2022-10-19 LAB
ALBUMIN SERPL ELPH-MCNC: 4.2 G/DL — SIGNIFICANT CHANGE UP (ref 3.5–5.2)
ALP SERPL-CCNC: 60 U/L — SIGNIFICANT CHANGE UP (ref 30–115)
ALT FLD-CCNC: 21 U/L — SIGNIFICANT CHANGE UP (ref 0–41)
ANION GAP SERPL CALC-SCNC: 9 MMOL/L — SIGNIFICANT CHANGE UP (ref 7–14)
AST SERPL-CCNC: 26 U/L — SIGNIFICANT CHANGE UP (ref 0–41)
BASOPHILS # BLD AUTO: 0.06 K/UL — SIGNIFICANT CHANGE UP (ref 0–0.2)
BASOPHILS NFR BLD AUTO: 0.6 % — SIGNIFICANT CHANGE UP (ref 0–1)
BILIRUB DIRECT SERPL-MCNC: <0.2 MG/DL — SIGNIFICANT CHANGE UP (ref 0–0.3)
BILIRUB INDIRECT FLD-MCNC: >0.1 MG/DL — LOW (ref 0.2–1.2)
BILIRUB SERPL-MCNC: 0.3 MG/DL — SIGNIFICANT CHANGE UP (ref 0.2–1.2)
BUN SERPL-MCNC: 22 MG/DL — HIGH (ref 10–20)
CALCIUM SERPL-MCNC: 9.5 MG/DL — SIGNIFICANT CHANGE UP (ref 8.4–10.5)
CHLORIDE SERPL-SCNC: 101 MMOL/L — SIGNIFICANT CHANGE UP (ref 98–110)
CO2 SERPL-SCNC: 31 MMOL/L — SIGNIFICANT CHANGE UP (ref 17–32)
CREAT SERPL-MCNC: 0.8 MG/DL — SIGNIFICANT CHANGE UP (ref 0.7–1.5)
EGFR: 77 ML/MIN/1.73M2 — SIGNIFICANT CHANGE UP
EOSINOPHIL # BLD AUTO: 0.15 K/UL — SIGNIFICANT CHANGE UP (ref 0–0.7)
EOSINOPHIL NFR BLD AUTO: 1.5 % — SIGNIFICANT CHANGE UP (ref 0–8)
GLUCOSE SERPL-MCNC: 112 MG/DL — HIGH (ref 70–99)
HCT VFR BLD CALC: 38.5 % — SIGNIFICANT CHANGE UP (ref 37–47)
HGB BLD-MCNC: 12.5 G/DL — SIGNIFICANT CHANGE UP (ref 12–16)
IMM GRANULOCYTES NFR BLD AUTO: 0.3 % — SIGNIFICANT CHANGE UP (ref 0.1–0.3)
LACTATE SERPL-SCNC: 0.6 MMOL/L — LOW (ref 0.7–2)
LIDOCAIN IGE QN: 35 U/L — SIGNIFICANT CHANGE UP (ref 7–60)
LYMPHOCYTES # BLD AUTO: 2.1 K/UL — SIGNIFICANT CHANGE UP (ref 1.2–3.4)
LYMPHOCYTES # BLD AUTO: 21.2 % — SIGNIFICANT CHANGE UP (ref 20.5–51.1)
MCHC RBC-ENTMCNC: 30.1 PG — SIGNIFICANT CHANGE UP (ref 27–31)
MCHC RBC-ENTMCNC: 32.5 G/DL — SIGNIFICANT CHANGE UP (ref 32–37)
MCV RBC AUTO: 92.8 FL — SIGNIFICANT CHANGE UP (ref 81–99)
MONOCYTES # BLD AUTO: 0.56 K/UL — SIGNIFICANT CHANGE UP (ref 0.1–0.6)
MONOCYTES NFR BLD AUTO: 5.7 % — SIGNIFICANT CHANGE UP (ref 1.7–9.3)
NEUTROPHILS # BLD AUTO: 7.01 K/UL — HIGH (ref 1.4–6.5)
NEUTROPHILS NFR BLD AUTO: 70.7 % — SIGNIFICANT CHANGE UP (ref 42.2–75.2)
NRBC # BLD: 0 /100 WBCS — SIGNIFICANT CHANGE UP (ref 0–0)
PLATELET # BLD AUTO: 213 K/UL — SIGNIFICANT CHANGE UP (ref 130–400)
POTASSIUM SERPL-MCNC: 3.9 MMOL/L — SIGNIFICANT CHANGE UP (ref 3.5–5)
POTASSIUM SERPL-SCNC: 3.9 MMOL/L — SIGNIFICANT CHANGE UP (ref 3.5–5)
PROT SERPL-MCNC: 6.5 G/DL — SIGNIFICANT CHANGE UP (ref 6–8)
RBC # BLD: 4.15 M/UL — LOW (ref 4.2–5.4)
RBC # FLD: 14.4 % — SIGNIFICANT CHANGE UP (ref 11.5–14.5)
SODIUM SERPL-SCNC: 141 MMOL/L — SIGNIFICANT CHANGE UP (ref 135–146)
WBC # BLD: 9.91 K/UL — SIGNIFICANT CHANGE UP (ref 4.8–10.8)
WBC # FLD AUTO: 9.91 K/UL — SIGNIFICANT CHANGE UP (ref 4.8–10.8)

## 2022-10-19 PROCEDURE — 76705 ECHO EXAM OF ABDOMEN: CPT | Mod: 26

## 2022-10-19 PROCEDURE — 99285 EMERGENCY DEPT VISIT HI MDM: CPT | Mod: FS

## 2022-10-19 PROCEDURE — 71045 X-RAY EXAM CHEST 1 VIEW: CPT | Mod: 26

## 2022-10-19 PROCEDURE — 93010 ELECTROCARDIOGRAM REPORT: CPT

## 2022-10-19 RX ORDER — MORPHINE SULFATE 50 MG/1
4 CAPSULE, EXTENDED RELEASE ORAL ONCE
Refills: 0 | Status: DISCONTINUED | OUTPATIENT
Start: 2022-10-19 | End: 2022-10-19

## 2022-10-19 RX ORDER — SODIUM CHLORIDE 9 MG/ML
1000 INJECTION INTRAMUSCULAR; INTRAVENOUS; SUBCUTANEOUS ONCE
Refills: 0 | Status: COMPLETED | OUTPATIENT
Start: 2022-10-19 | End: 2022-10-19

## 2022-10-19 RX ORDER — ONDANSETRON 8 MG/1
4 TABLET, FILM COATED ORAL ONCE
Refills: 0 | Status: COMPLETED | OUTPATIENT
Start: 2022-10-19 | End: 2022-10-19

## 2022-10-19 RX ADMIN — SODIUM CHLORIDE 1000 MILLILITER(S): 9 INJECTION INTRAMUSCULAR; INTRAVENOUS; SUBCUTANEOUS at 23:38

## 2022-10-19 RX ADMIN — ONDANSETRON 4 MILLIGRAM(S): 8 TABLET, FILM COATED ORAL at 22:04

## 2022-10-19 RX ADMIN — MORPHINE SULFATE 4 MILLIGRAM(S): 50 CAPSULE, EXTENDED RELEASE ORAL at 22:04

## 2022-10-19 NOTE — ED ADULT NURSE NOTE - NSICDXPASTMEDICALHX_GEN_ALL_CORE_FT
7x IV attempts. md Benton made aware blood pressure 86/59 PAST MEDICAL HISTORY:  Diverticulosis

## 2022-10-20 ENCOUNTER — RESULT REVIEW (OUTPATIENT)
Age: 77
End: 2022-10-20

## 2022-10-20 ENCOUNTER — TRANSCRIPTION ENCOUNTER (OUTPATIENT)
Age: 77
End: 2022-10-20

## 2022-10-20 LAB
ALBUMIN SERPL ELPH-MCNC: 3.8 G/DL — SIGNIFICANT CHANGE UP (ref 3.5–5.2)
ALP SERPL-CCNC: 51 U/L — SIGNIFICANT CHANGE UP (ref 30–115)
ALT FLD-CCNC: 22 U/L — SIGNIFICANT CHANGE UP (ref 0–41)
ANION GAP SERPL CALC-SCNC: 6 MMOL/L — LOW (ref 7–14)
APPEARANCE UR: CLEAR — SIGNIFICANT CHANGE UP
APTT BLD: 31.6 SEC — SIGNIFICANT CHANGE UP (ref 27–39.2)
AST SERPL-CCNC: 26 U/L — SIGNIFICANT CHANGE UP (ref 0–41)
BILIRUB DIRECT SERPL-MCNC: <0.2 MG/DL — SIGNIFICANT CHANGE UP (ref 0–0.3)
BILIRUB INDIRECT FLD-MCNC: < 0.2 MG/DL — SIGNIFICANT CHANGE UP (ref 0.2–1.2)
BILIRUB SERPL-MCNC: <0.2 MG/DL — SIGNIFICANT CHANGE UP (ref 0.2–1.2)
BILIRUB UR-MCNC: NEGATIVE — SIGNIFICANT CHANGE UP
BLD GP AB SCN SERPL QL: SIGNIFICANT CHANGE UP
BUN SERPL-MCNC: 18 MG/DL — SIGNIFICANT CHANGE UP (ref 10–20)
CALCIUM SERPL-MCNC: 8.7 MG/DL — SIGNIFICANT CHANGE UP (ref 8.4–10.5)
CHLORIDE SERPL-SCNC: 104 MMOL/L — SIGNIFICANT CHANGE UP (ref 98–110)
CO2 SERPL-SCNC: 29 MMOL/L — SIGNIFICANT CHANGE UP (ref 17–32)
COLOR SPEC: YELLOW — SIGNIFICANT CHANGE UP
CREAT SERPL-MCNC: 0.8 MG/DL — SIGNIFICANT CHANGE UP (ref 0.7–1.5)
DIFF PNL FLD: NEGATIVE — SIGNIFICANT CHANGE UP
EGFR: 77 ML/MIN/1.73M2 — SIGNIFICANT CHANGE UP
GLUCOSE SERPL-MCNC: 78 MG/DL — SIGNIFICANT CHANGE UP (ref 70–99)
GLUCOSE UR QL: NEGATIVE MG/DL — SIGNIFICANT CHANGE UP
HCT VFR BLD CALC: 35.9 % — LOW (ref 37–47)
HGB BLD-MCNC: 11.7 G/DL — LOW (ref 12–16)
INR BLD: 0.97 RATIO — SIGNIFICANT CHANGE UP (ref 0.65–1.3)
KETONES UR-MCNC: NEGATIVE — SIGNIFICANT CHANGE UP
LEUKOCYTE ESTERASE UR-ACNC: NEGATIVE — SIGNIFICANT CHANGE UP
MCHC RBC-ENTMCNC: 30.3 PG — SIGNIFICANT CHANGE UP (ref 27–31)
MCHC RBC-ENTMCNC: 32.6 G/DL — SIGNIFICANT CHANGE UP (ref 32–37)
MCV RBC AUTO: 93 FL — SIGNIFICANT CHANGE UP (ref 81–99)
NITRITE UR-MCNC: NEGATIVE — SIGNIFICANT CHANGE UP
NRBC # BLD: 0 /100 WBCS — SIGNIFICANT CHANGE UP (ref 0–0)
PH UR: 7 — SIGNIFICANT CHANGE UP (ref 5–8)
PLATELET # BLD AUTO: 155 K/UL — SIGNIFICANT CHANGE UP (ref 130–400)
POTASSIUM SERPL-MCNC: 4.2 MMOL/L — SIGNIFICANT CHANGE UP (ref 3.5–5)
POTASSIUM SERPL-SCNC: 4.2 MMOL/L — SIGNIFICANT CHANGE UP (ref 3.5–5)
PROT SERPL-MCNC: 5.6 G/DL — LOW (ref 6–8)
PROT UR-MCNC: NEGATIVE MG/DL — SIGNIFICANT CHANGE UP
PROTHROM AB SERPL-ACNC: 11 SEC — SIGNIFICANT CHANGE UP (ref 9.95–12.87)
RBC # BLD: 3.86 M/UL — LOW (ref 4.2–5.4)
RBC # FLD: 14.5 % — SIGNIFICANT CHANGE UP (ref 11.5–14.5)
SARS-COV-2 RNA SPEC QL NAA+PROBE: SIGNIFICANT CHANGE UP
SODIUM SERPL-SCNC: 139 MMOL/L — SIGNIFICANT CHANGE UP (ref 135–146)
SP GR SPEC: 1.01 — SIGNIFICANT CHANGE UP (ref 1.01–1.03)
UROBILINOGEN FLD QL: 0.2 MG/DL — SIGNIFICANT CHANGE UP
WBC # BLD: 6.81 K/UL — SIGNIFICANT CHANGE UP (ref 4.8–10.8)
WBC # FLD AUTO: 6.81 K/UL — SIGNIFICANT CHANGE UP (ref 4.8–10.8)

## 2022-10-20 PROCEDURE — 88304 TISSUE EXAM BY PATHOLOGIST: CPT | Mod: 26

## 2022-10-20 PROCEDURE — 74177 CT ABD & PELVIS W/CONTRAST: CPT | Mod: 26,MA

## 2022-10-20 PROCEDURE — 93306 TTE W/DOPPLER COMPLETE: CPT | Mod: 26

## 2022-10-20 PROCEDURE — 93010 ELECTROCARDIOGRAM REPORT: CPT

## 2022-10-20 RX ORDER — INFLUENZA VIRUS VACCINE 15; 15; 15; 15 UG/.5ML; UG/.5ML; UG/.5ML; UG/.5ML
0.7 SUSPENSION INTRAMUSCULAR ONCE
Refills: 0 | Status: DISCONTINUED | OUTPATIENT
Start: 2022-10-20 | End: 2022-10-21

## 2022-10-20 RX ORDER — CEFOTETAN DISODIUM 1 G
1 VIAL (EA) INJECTION EVERY 12 HOURS
Refills: 0 | Status: DISCONTINUED | OUTPATIENT
Start: 2022-10-20 | End: 2022-10-20

## 2022-10-20 RX ORDER — NICOTINE POLACRILEX 2 MG
1 GUM BUCCAL ONCE
Refills: 0 | Status: COMPLETED | OUTPATIENT
Start: 2022-10-20 | End: 2022-10-20

## 2022-10-20 RX ORDER — OXYCODONE HYDROCHLORIDE 5 MG/1
5 TABLET ORAL EVERY 8 HOURS
Refills: 0 | Status: DISCONTINUED | OUTPATIENT
Start: 2022-10-20 | End: 2022-10-21

## 2022-10-20 RX ORDER — ONDANSETRON 8 MG/1
4 TABLET, FILM COATED ORAL EVERY 6 HOURS
Refills: 0 | Status: DISCONTINUED | OUTPATIENT
Start: 2022-10-20 | End: 2022-10-20

## 2022-10-20 RX ORDER — HEPARIN SODIUM 5000 [USP'U]/ML
5000 INJECTION INTRAVENOUS; SUBCUTANEOUS EVERY 8 HOURS
Refills: 0 | Status: DISCONTINUED | OUTPATIENT
Start: 2022-10-20 | End: 2022-10-20

## 2022-10-20 RX ORDER — KETOROLAC TROMETHAMINE 30 MG/ML
15 SYRINGE (ML) INJECTION EVERY 6 HOURS
Refills: 0 | Status: DISCONTINUED | OUTPATIENT
Start: 2022-10-20 | End: 2022-10-21

## 2022-10-20 RX ORDER — HYDROMORPHONE HYDROCHLORIDE 2 MG/ML
0.5 INJECTION INTRAMUSCULAR; INTRAVENOUS; SUBCUTANEOUS
Refills: 0 | Status: DISCONTINUED | OUTPATIENT
Start: 2022-10-20 | End: 2022-10-20

## 2022-10-20 RX ORDER — FAMOTIDINE 10 MG/ML
20 INJECTION INTRAVENOUS DAILY
Refills: 0 | Status: DISCONTINUED | OUTPATIENT
Start: 2022-10-20 | End: 2022-10-21

## 2022-10-20 RX ORDER — SODIUM CHLORIDE 9 MG/ML
1000 INJECTION INTRAMUSCULAR; INTRAVENOUS; SUBCUTANEOUS
Refills: 0 | Status: DISCONTINUED | OUTPATIENT
Start: 2022-10-20 | End: 2022-10-20

## 2022-10-20 RX ORDER — CEFOTETAN DISODIUM 1 G
VIAL (EA) INJECTION
Refills: 0 | Status: DISCONTINUED | OUTPATIENT
Start: 2022-10-20 | End: 2022-10-20

## 2022-10-20 RX ORDER — CIPROFLOXACIN LACTATE 400MG/40ML
1 VIAL (ML) INTRAVENOUS
Qty: 7 | Refills: 0
Start: 2022-10-20 | End: 2022-10-26

## 2022-10-20 RX ORDER — ONDANSETRON 8 MG/1
4 TABLET, FILM COATED ORAL EVERY 6 HOURS
Refills: 0 | Status: DISCONTINUED | OUTPATIENT
Start: 2022-10-20 | End: 2022-10-21

## 2022-10-20 RX ORDER — SODIUM CHLORIDE 9 MG/ML
1000 INJECTION, SOLUTION INTRAVENOUS
Refills: 0 | Status: DISCONTINUED | OUTPATIENT
Start: 2022-10-20 | End: 2022-10-20

## 2022-10-20 RX ORDER — ONDANSETRON 8 MG/1
4 TABLET, FILM COATED ORAL ONCE
Refills: 0 | Status: DISCONTINUED | OUTPATIENT
Start: 2022-10-20 | End: 2022-10-20

## 2022-10-20 RX ORDER — CEFOTETAN DISODIUM 1 G
1 VIAL (EA) INJECTION ONCE
Refills: 0 | Status: COMPLETED | OUTPATIENT
Start: 2022-10-20 | End: 2022-10-20

## 2022-10-20 RX ORDER — PANTOPRAZOLE SODIUM 20 MG/1
40 TABLET, DELAYED RELEASE ORAL ONCE
Refills: 0 | Status: COMPLETED | OUTPATIENT
Start: 2022-10-20 | End: 2022-10-20

## 2022-10-20 RX ORDER — FAMOTIDINE 10 MG/ML
20 INJECTION INTRAVENOUS DAILY
Refills: 0 | Status: DISCONTINUED | OUTPATIENT
Start: 2022-10-20 | End: 2022-10-20

## 2022-10-20 RX ORDER — MORPHINE SULFATE 50 MG/1
2 CAPSULE, EXTENDED RELEASE ORAL EVERY 4 HOURS
Refills: 0 | Status: DISCONTINUED | OUTPATIENT
Start: 2022-10-20 | End: 2022-10-20

## 2022-10-20 RX ORDER — HEPARIN SODIUM 5000 [USP'U]/ML
5000 INJECTION INTRAVENOUS; SUBCUTANEOUS EVERY 8 HOURS
Refills: 0 | Status: DISCONTINUED | OUTPATIENT
Start: 2022-10-20 | End: 2022-10-21

## 2022-10-20 RX ORDER — ACETAMINOPHEN 500 MG
650 TABLET ORAL EVERY 6 HOURS
Refills: 0 | Status: DISCONTINUED | OUTPATIENT
Start: 2022-10-20 | End: 2022-10-21

## 2022-10-20 RX ADMIN — FAMOTIDINE 20 MILLIGRAM(S): 10 INJECTION INTRAVENOUS at 12:29

## 2022-10-20 RX ADMIN — Medication 1 PATCH: at 17:57

## 2022-10-20 RX ADMIN — Medication 100 GRAM(S): at 02:25

## 2022-10-20 RX ADMIN — HYDROMORPHONE HYDROCHLORIDE 0.5 MILLIGRAM(S): 2 INJECTION INTRAMUSCULAR; INTRAVENOUS; SUBCUTANEOUS at 18:31

## 2022-10-20 RX ADMIN — PANTOPRAZOLE SODIUM 40 MILLIGRAM(S): 20 TABLET, DELAYED RELEASE ORAL at 02:25

## 2022-10-20 RX ADMIN — HEPARIN SODIUM 5000 UNIT(S): 5000 INJECTION INTRAVENOUS; SUBCUTANEOUS at 21:32

## 2022-10-20 RX ADMIN — HYDROMORPHONE HYDROCHLORIDE 0.5 MILLIGRAM(S): 2 INJECTION INTRAMUSCULAR; INTRAVENOUS; SUBCUTANEOUS at 18:01

## 2022-10-20 RX ADMIN — SODIUM CHLORIDE 100 MILLILITER(S): 9 INJECTION, SOLUTION INTRAVENOUS at 17:57

## 2022-10-20 RX ADMIN — Medication 100 GRAM(S): at 06:43

## 2022-10-20 RX ADMIN — Medication 1 PATCH: at 05:01

## 2022-10-20 RX ADMIN — SODIUM CHLORIDE 75 MILLILITER(S): 9 INJECTION INTRAMUSCULAR; INTRAVENOUS; SUBCUTANEOUS at 06:44

## 2022-10-20 NOTE — H&P ADULT - ASSESSMENT
76 y/o with PMH of Diverticulosis, Kidney stone presents to the ED with c/o 1 day hx of epigastric/RUQ pain associated with nausea but no vomiting. Denies fever/chills, couhg, CP, SOB, palpitations and urinary symptoms. Last colonoscopy was ~ 3 months ago and ok per pt.     A/P:  -NPO  -IV hydration  -IV abx  -Pain mgmt  -GI and DVT prophylaxis  76 y/o with PMH of Diverticulosis, Kidney stone presents to the ED with c/o 1 day hx of epigastric/RUQ pain associated with nausea but no vomiting. Denies fever/chills, cough, CP, SOB, palpitations and urinary symptoms. Last colonoscopy was ~ 3 months ago and ok per pt.     A/P:  -NPO  -IV hydration  -IV abx  -Pain mgmt  -GI and DVT prophylaxis  78 y/o with PMH of Diverticulosis, Kidney stone presents to the ED with c/o 1 day hx of epigastric/RUQ pain associated with nausea but no vomiting. Denies fever/chills, cough, CP, SOB, palpitations and urinary symptoms. Last colonoscopy was ~ 3 months ago and ok per pt.     A/P:  -NPO  -IV hydration  -IV abx  -Pain mgmt  -GI and DVT prophylaxis   -Add on to OR schedule for possible lap nuzhat  -Discussed with Dr. Plummer

## 2022-10-20 NOTE — H&P ADULT - HISTORY OF PRESENT ILLNESS
78 y/o with PMH of Diverticulosis, Kidney stone presents to the ED with c/o 1 day hx of epigastric/RUQ pain associated with nausea but no vomiting. Denies fever/chills, couhg, CP, SOB, palpitations and urinary symptoms. Last colonoscopy was ~ 3 months ago and ok per pt.  78 y/o with PMH of Diverticulosis, Kidney stone presents to the ED with c/o 1 day hx of epigastric/RUQ pain associated with nausea but no vomiting. Denies fever/chills, cough, CP, SOB, palpitations and urinary symptoms. Last colonoscopy was ~ 3 months ago and ok per pt.

## 2022-10-20 NOTE — ED PROVIDER NOTE - CLINICAL SUMMARY MEDICAL DECISION MAKING FREE TEXT BOX
76 yo F, hx of diverticulitis here for assessment of RUQ abdominal pain radiating to back with associated nausea, no vomiting. No fever, chills.  Patient found to have findings concerning for cholecystitis on US with ?hydro prompting CT scan which confirmed GB findings, and also demonstrated possible perigastric fluid.    Labs normal, no signs of choledocholithiasis, ascending cholangitis.    Patient received abx, seen by surgical PA and accepted for admission to Dr. Plummer's service.

## 2022-10-20 NOTE — BRIEF OPERATIVE NOTE - OPERATION/FINDINGS
Cystic duct and artery isolated. Cystic plate visualized. Gallbladder removed with stone inside. Hemostasis achieved and incisions closed in layers

## 2022-10-20 NOTE — ED PROVIDER NOTE - OBJECTIVE STATEMENT
77 year old female past medical history of diverticulitis comes to emergency room for RUQ pain for the last day with nausea no fever/chills.

## 2022-10-20 NOTE — CHART NOTE - NSCHARTNOTEFT_GEN_A_CORE
d/w dr. thapa     poss OR as add on today for lap nuzhat     keep npo   echo   he will d/w pcp dr chavez     pt aware

## 2022-10-20 NOTE — H&P ADULT - ATTENDING COMMENTS
above noted discussed case with DR DICKINSON, pt and surgical resident abdomen soft tender RUQ ct scan and sonogram noted for surgery

## 2022-10-20 NOTE — H&P ADULT - NSHPPHYSICALEXAM_GEN_ALL_CORE
Vital Signs Last 24 Hrs  T(C): 35.6 (19 Oct 2022 21:34), Max: 35.6 (19 Oct 2022 21:34)  T(F): 96.1 (19 Oct 2022 21:34), Max: 96.1 (19 Oct 2022 21:34)  HR: 63 (19 Oct 2022 21:34) (63 - 63)  BP: 160/70 (19 Oct 2022 21:34) (160/70 - 160/70)  BP(mean): --  RR: 18 (19 Oct 2022 21:34) (18 - 18)  SpO2: 98% (19 Oct 2022 21:34) (98% - 98%)    Parameters below as of 19 Oct 2022 21:34  Patient On (Oxygen Delivery Method): room air        Constitutional: NAD, A&O x3    Eyes: PERRLA, no conjuctivitis    Neck: no lymphadenopathy    Respiratory: +air entry, no rales, no rhonchi, no wheezes    Cardiovascular: +S1 and S2, regular rate and rhythm    Gastrointestinal: +BS, soft, mildly tender RUQ, no Mccloud's, not distended    Extremities:  no edema, no calf tenderness    Neurological: sensation intact, ROM equal B/L, CN II-XII intact    Skin: no rashes, normal turgor

## 2022-10-20 NOTE — CHART NOTE - NSCHARTNOTEFT_GEN_A_CORE
PACU ANESTHESIA ADMISSION NOTE      Procedure: Laparoscopic cholecystectomy      Post op diagnosis:  Acute cholecystitis        ____  Intubated  TV:______       Rate: ______      FiO2: ______    _x___  Patent Airway    __x__  Full return of protective reflexes    _x___  Full recovery from anesthesia / back to baseline     Vitals:   T:  97.5         R:   14               BP:  166/72                Sat:     99%              P: 71    Mental Status:  ___x_ Awake   __x___ Alert   _____ Drowsy   _____ Sedated    Nausea/Vomiting:  x____ NO  ______Yes,   See Post - Op Orders          Pain Scale (0-10):  __0___    Treatment: _x___ None    ____ See Post - Op/PCA Orders    Post - Operative Fluids:   ____ Oral   ___x_ See Post - Op Orders    Plan: Discharge:   __x__Home       _____Floor     _____Critical Care    _____  Other:_________________    Comments:

## 2022-10-20 NOTE — ED PROVIDER NOTE - PHYSICAL EXAMINATION
Physical Exam    Vital Signs: I have reviewed the initial vital signs.  Constitutional: well-nourished, appears stated age, no acute distress  Eyes: Conjunctiva pink, Sclera clear, PERRLA, EOMI.  Cardiovascular: S1 and S2, regular rate, regular rhythm, well-perfused extremities, radial pulses equal and 2+  Respiratory: unlabored respiratory effort, clear to auscultation bilaterally no wheezing, rales and rhonchi  Gastrointestinal: soft, RUQ Tenderness and positive murphys , no pulsatile mass, normal bowl sounds  Musculoskeletal: supple neck, no lower extremity edema, no midline tenderness  Integumentary: warm, dry, no rash  Neurologic: awake, alert, cranial nerves II-XII grossly intact, extremities’ motor and sensory functions grossly intact  Psychiatric: appropriate mood, appropriate affect

## 2022-10-20 NOTE — CHART NOTE - NSCHARTNOTEFT_GEN_A_CORE
76y/o female admitted for acute cholecystitis s/p Laparoscopic cholecystectomy. Seen and examined, in bed, NAD, no complaints, AAox3. Hemodynamically stable, Incisions; Dressing in place, C/D/I.     A/P;  -Pain mgmt  -Encourage OOB to chair/amb in am  -Encourage incentive helene  -GI and DVT prophylaxis   -D/C planning

## 2022-10-20 NOTE — ED PROVIDER NOTE - NS ED ATTENDING STATEMENT MOD
This was a shared visit with the RELL. I reviewed and verified the documentation and independently performed the documented:

## 2022-10-20 NOTE — H&P ADULT - NSHPLABSRESULTS_GEN_ALL_CORE
12.5   9.91  )-----------( 213      ( 19 Oct 2022 22:15 )             38.5     10-19    141  |  101  |  22<H>  ----------------------------<  112<H>  3.9   |  31  |  0.8    Ca    9.5      19 Oct 2022 22:15    TPro  6.5  /  Alb  4.2  /  TBili  0.3  /  DBili  <0.2  /  AST  26  /  ALT  21  /  AlkPhos  60  10-19          Urinalysis Basic - ( 20 Oct 2022 04:20 )    Color: Yellow / Appearance: Clear / S.015 / pH: x  Gluc: x / Ketone: Negative  / Bili: Negative / Urobili: 0.2 mg/dL   Blood: x / Protein: Negative mg/dL / Nitrite: Negative   Leuk Esterase: Negative / RBC: x / WBC x   Sq Epi: x / Non Sq Epi: x / Bacteria: x            < from: CT Abdomen and Pelvis w/ IV Cont (10.20.22 @ 00:36) >      IMPRESSION:    Nonspecific fluid/edema in the upper abdomen which appears primarily   centered around the stomach. Findings may reflect gastritis or gastric   ulcer disease.    Cholelithiasis.      < end of copied text >    < from: US Abdomen Upper Quadrant Right (10.19.22 @ 22:46) >      IMPRESSION:    Cholelithiasis with gallbladder wall thickening. Sonographic Mccloud sign   is reportedly indeterminate. Therefore findings are equivocal for acute   cholecystitis. Consider evaluation with HIDA scan.    Mild right hydronephrosis of unclear etiology. Consider CT correlation.    < end of copied text >

## 2022-10-20 NOTE — PATIENT PROFILE ADULT - HAVE YOU RECEIVED AT LEAST TWO PFIZER AND/OR MODERNA VACCINATIONS (IN ANY COMBINATION) AND/OR ONE JOHNSON & JOHNSON VACCINATION?
Tammi Umanzor presents today for   Chief Complaint   Patient presents with    Blurred Vision    Vision Exam   .    HPI     Blurred Vision     In both eyes. Vision is blurred. Context:  distance vision. Comments     Last Vision Exam: 2/19/2019 aw  Last Ophthalmology Exam: n/a  Last Filled Glasses Rx: 12/19/2018  Insurance: Nanette Hugheso  Update: Glasses; 6 month follow up  Some things far away are a little blurry. No current outpatient medications on file. No current facility-administered medications for this visit. Family History   Problem Relation Age of Onset    Glaucoma Neg Hx     Diabetes Neg Hx     Cataracts Neg Hx      Social History     Socioeconomic History    Marital status: Single     Spouse name: None    Number of children: None    Years of education: None    Highest education level: None   Occupational History    None   Social Needs    Financial resource strain: None    Food insecurity:     Worry: None     Inability: None    Transportation needs:     Medical: None     Non-medical: None   Tobacco Use    Smoking status: None   Substance and Sexual Activity    Alcohol use: None    Drug use: None    Sexual activity: None   Lifestyle    Physical activity:     Days per week: None     Minutes per session: None    Stress: None   Relationships    Social connections:     Talks on phone: None     Gets together: None     Attends [employer] service: None     Active member of club or organization: None     Attends meetings of clubs or organizations: None     Relationship status: None    Intimate partner violence:     Fear of current or ex partner: None     Emotionally abused: None     Physically abused: None     Forced sexual activity: None   Other Topics Concern    None   Social History Narrative    None     History reviewed. No pertinent past medical history.         Main Ophthalmology Exam     External Exam       Right Left    External Normal Normal
Yes

## 2022-10-21 ENCOUNTER — TRANSCRIPTION ENCOUNTER (OUTPATIENT)
Age: 77
End: 2022-10-21

## 2022-10-21 VITALS
DIASTOLIC BLOOD PRESSURE: 63 MMHG | TEMPERATURE: 98 F | SYSTOLIC BLOOD PRESSURE: 133 MMHG | RESPIRATION RATE: 18 BRPM | HEART RATE: 71 BPM

## 2022-10-21 LAB
ALBUMIN SERPL ELPH-MCNC: 3.6 G/DL — SIGNIFICANT CHANGE UP (ref 3.5–5.2)
ALP SERPL-CCNC: 46 U/L — SIGNIFICANT CHANGE UP (ref 30–115)
ALT FLD-CCNC: 45 U/L — HIGH (ref 0–41)
ANION GAP SERPL CALC-SCNC: 8 MMOL/L — SIGNIFICANT CHANGE UP (ref 7–14)
AST SERPL-CCNC: 59 U/L — HIGH (ref 0–41)
BASOPHILS # BLD AUTO: 0.03 K/UL — SIGNIFICANT CHANGE UP (ref 0–0.2)
BASOPHILS NFR BLD AUTO: 0.4 % — SIGNIFICANT CHANGE UP (ref 0–1)
BILIRUB DIRECT SERPL-MCNC: <0.2 MG/DL — SIGNIFICANT CHANGE UP (ref 0–0.3)
BILIRUB INDIRECT FLD-MCNC: >0 MG/DL — LOW (ref 0.2–1.2)
BILIRUB SERPL-MCNC: 0.2 MG/DL — SIGNIFICANT CHANGE UP (ref 0.2–1.2)
BUN SERPL-MCNC: 13 MG/DL — SIGNIFICANT CHANGE UP (ref 10–20)
CALCIUM SERPL-MCNC: 8.3 MG/DL — LOW (ref 8.4–10.5)
CHLORIDE SERPL-SCNC: 104 MMOL/L — SIGNIFICANT CHANGE UP (ref 98–110)
CO2 SERPL-SCNC: 28 MMOL/L — SIGNIFICANT CHANGE UP (ref 17–32)
CREAT SERPL-MCNC: 0.7 MG/DL — SIGNIFICANT CHANGE UP (ref 0.7–1.5)
CULTURE RESULTS: SIGNIFICANT CHANGE UP
EGFR: 90 ML/MIN/1.73M2 — SIGNIFICANT CHANGE UP
EOSINOPHIL # BLD AUTO: 0.02 K/UL — SIGNIFICANT CHANGE UP (ref 0–0.7)
EOSINOPHIL NFR BLD AUTO: 0.3 % — SIGNIFICANT CHANGE UP (ref 0–8)
GLUCOSE SERPL-MCNC: 74 MG/DL — SIGNIFICANT CHANGE UP (ref 70–99)
HCT VFR BLD CALC: 34.5 % — LOW (ref 37–47)
HGB BLD-MCNC: 11 G/DL — LOW (ref 12–16)
IMM GRANULOCYTES NFR BLD AUTO: 0.1 % — SIGNIFICANT CHANGE UP (ref 0.1–0.3)
LYMPHOCYTES # BLD AUTO: 1.84 K/UL — SIGNIFICANT CHANGE UP (ref 1.2–3.4)
LYMPHOCYTES # BLD AUTO: 25.5 % — SIGNIFICANT CHANGE UP (ref 20.5–51.1)
MAGNESIUM SERPL-MCNC: 2 MG/DL — SIGNIFICANT CHANGE UP (ref 1.8–2.4)
MANUAL SMEAR VERIFICATION: SIGNIFICANT CHANGE UP
MCHC RBC-ENTMCNC: 29.7 PG — SIGNIFICANT CHANGE UP (ref 27–31)
MCHC RBC-ENTMCNC: 31.9 G/DL — LOW (ref 32–37)
MCV RBC AUTO: 93.2 FL — SIGNIFICANT CHANGE UP (ref 81–99)
MONOCYTES # BLD AUTO: 0.49 K/UL — SIGNIFICANT CHANGE UP (ref 0.1–0.6)
MONOCYTES NFR BLD AUTO: 6.8 % — SIGNIFICANT CHANGE UP (ref 1.7–9.3)
NEUTROPHILS # BLD AUTO: 4.82 K/UL — SIGNIFICANT CHANGE UP (ref 1.4–6.5)
NEUTROPHILS NFR BLD AUTO: 66.9 % — SIGNIFICANT CHANGE UP (ref 42.2–75.2)
NRBC # BLD: 0 /100 WBCS — SIGNIFICANT CHANGE UP (ref 0–0)
PHOSPHATE SERPL-MCNC: 3.9 MG/DL — SIGNIFICANT CHANGE UP (ref 2.1–4.9)
PLAT MORPH BLD: ABNORMAL
PLATELET # BLD AUTO: 91 K/UL — LOW (ref 130–400)
PLATELET CLUMP BLD QL SMEAR: ABNORMAL
POTASSIUM SERPL-MCNC: 4.5 MMOL/L — SIGNIFICANT CHANGE UP (ref 3.5–5)
POTASSIUM SERPL-SCNC: 4.5 MMOL/L — SIGNIFICANT CHANGE UP (ref 3.5–5)
PROT SERPL-MCNC: 5.1 G/DL — LOW (ref 6–8)
RBC # BLD: 3.7 M/UL — LOW (ref 4.2–5.4)
RBC # FLD: 14.2 % — SIGNIFICANT CHANGE UP (ref 11.5–14.5)
RBC BLD AUTO: NORMAL — SIGNIFICANT CHANGE UP
SODIUM SERPL-SCNC: 140 MMOL/L — SIGNIFICANT CHANGE UP (ref 135–146)
SPECIMEN SOURCE: SIGNIFICANT CHANGE UP
WBC # BLD: 7.21 K/UL — SIGNIFICANT CHANGE UP (ref 4.8–10.8)
WBC # FLD AUTO: 7.21 K/UL — SIGNIFICANT CHANGE UP (ref 4.8–10.8)

## 2022-10-21 RX ORDER — CEFOTETAN DISODIUM 1 G
1 VIAL (EA) INJECTION EVERY 12 HOURS
Refills: 0 | Status: DISCONTINUED | OUTPATIENT
Start: 2022-10-21 | End: 2022-10-21

## 2022-10-21 RX ORDER — IBUPROFEN 200 MG
1 TABLET ORAL
Qty: 9 | Refills: 0
Start: 2022-10-21 | End: 2022-10-23

## 2022-10-21 RX ORDER — ACETAMINOPHEN 500 MG
2 TABLET ORAL
Qty: 0 | Refills: 0 | DISCHARGE
Start: 2022-10-21

## 2022-10-21 RX ORDER — SODIUM CHLORIDE 9 MG/ML
3 INJECTION INTRAMUSCULAR; INTRAVENOUS; SUBCUTANEOUS EVERY 8 HOURS
Refills: 0 | Status: DISCONTINUED | OUTPATIENT
Start: 2022-10-21 | End: 2022-10-21

## 2022-10-21 RX ADMIN — ONDANSETRON 4 MILLIGRAM(S): 8 TABLET, FILM COATED ORAL at 01:31

## 2022-10-21 RX ADMIN — OXYCODONE HYDROCHLORIDE 5 MILLIGRAM(S): 5 TABLET ORAL at 02:30

## 2022-10-21 RX ADMIN — HEPARIN SODIUM 5000 UNIT(S): 5000 INJECTION INTRAVENOUS; SUBCUTANEOUS at 11:46

## 2022-10-21 RX ADMIN — Medication 15 MILLIGRAM(S): at 01:30

## 2022-10-21 RX ADMIN — FAMOTIDINE 20 MILLIGRAM(S): 10 INJECTION INTRAVENOUS at 11:46

## 2022-10-21 RX ADMIN — Medication 100 GRAM(S): at 10:34

## 2022-10-21 RX ADMIN — Medication 15 MILLIGRAM(S): at 02:30

## 2022-10-21 RX ADMIN — OXYCODONE HYDROCHLORIDE 5 MILLIGRAM(S): 5 TABLET ORAL at 01:28

## 2022-10-21 NOTE — PROGRESS NOTE ADULT - SUBJECTIVE AND OBJECTIVE BOX
S/P day 1 Laparoscopic cholecystectomy  · Operative Findings	Cystic duct and artery isolated. Cystic plate visualized. Gallbladder removed with stone inside. Hemostasis achieved and incisions closed in layers     S:   Pt is w/o new complaints. States she is feeling well and tolerating diet. Denies N/V/D, fever, chills. +urination, +ambulation.     O:   Vital Signs Last 24 Hrs  T(C): 36.5 (21 Oct 2022 04:30), Max: 36.6 (20 Oct 2022 14:00)  T(F): 97.7 (21 Oct 2022 04:30), Max: 97.8 (20 Oct 2022 14:00)  HR: 71 (21 Oct 2022 04:30) (62 - 77)  BP: 133/63 (21 Oct 2022 04:30) (119/55 - 160/72)  BP(mean): 63 (20 Oct 2022 15:45) (63 - 63)  RR: 18 (21 Oct 2022 04:30) (10 - 20)  SpO2: 100% (20 Oct 2022 18:30) (98% - 100%)    Parameters below as of 20 Oct 2022 18:30  Patient On (Oxygen Delivery Method): nasal cannula    MEDICATIONS  (STANDING):  cefoTEtan  IVPB 1 Gram(s) IV Intermittent every 12 hours  famotidine Injectable 20 milliGRAM(s) IV Push daily  heparin   Injectable 5000 Unit(s) SubCutaneous every 8 hours  influenza  Vaccine (HIGH DOSE) 0.7 milliLiter(s) IntraMuscular once  sodium chloride 0.9% lock flush 3 milliLiter(s) IV Push every 8 hours    MEDICATIONS  (PRN):  acetaminophen     Tablet .. 650 milliGRAM(s) Oral every 6 hours PRN Mild Pain (1 - 3), Moderate Pain (4 - 6)  ketorolac   Injectable 15 milliGRAM(s) IV Push every 6 hours PRN Moderate Pain (4 - 6)  ondansetron Injectable 4 milliGRAM(s) IV Push every 6 hours PRN Nausea and/or Vomiting  oxyCODONE    IR 5 milliGRAM(s) Oral every 8 hours PRN Severe Pain (7 - 10)    Exam:   General: Well developed, well nourished and in no acute distress  Cardio: S2 and S1 clear and crisp   Pulm: Clear and equal breath sounds bilaterally with resonance throughout   Abd: Soft, non-distended, tenderness around incision sites, without guarding. Dressings clean and dry    10-21    140  |  104  |  13  ----------------------------<  74  4.5   |  28  |  0.7    Ca    8.3<L>      21 Oct 2022 06:00  Phos  3.9     10-21  Mg     2.0     10-21    TPro  5.1<L>  /  Alb  3.6  /  TBili  0.2  /  DBili  <0.2  /  AST  59<H>  /  ALT  45<H>  /  AlkPhos  46  10-21                            11.0   7.21  )-----------( 91       ( 21 Oct 2022 06:00 )             34.5     CAPILLARY BLOOD GLUCOSE             POD 1  S/P Laparoscopic cholecystectomy  · Operative Findings	Cystic duct and artery isolated. Cystic plate visualized. Gallbladder removed with stone inside. Hemostasis achieved and incisions closed in layers     S:   Pt is w/o new complaints. States she is feeling well and tolerating diet. Denies N/V/D, fever, chills. +urination, +ambulation.   wants to eat    O:   Vital Signs Last 24 Hrs  T(C): 36.5 (21 Oct 2022 04:30), Max: 36.6 (20 Oct 2022 14:00)  T(F): 97.7 (21 Oct 2022 04:30), Max: 97.8 (20 Oct 2022 14:00)  HR: 71 (21 Oct 2022 04:30) (62 - 77)  BP: 133/63 (21 Oct 2022 04:30) (119/55 - 160/72)  BP(mean): 63 (20 Oct 2022 15:45) (63 - 63)  RR: 18 (21 Oct 2022 04:30) (10 - 20)  SpO2: 100% (20 Oct 2022 18:30) (98% - 100%)    Parameters below as of 20 Oct 2022 18:30  Patient On (Oxygen Delivery Method): nasal cannula    MEDICATIONS  (STANDING):  cefoTEtan  IVPB 1 Gram(s) IV Intermittent every 12 hours  famotidine Injectable 20 milliGRAM(s) IV Push daily  heparin   Injectable 5000 Unit(s) SubCutaneous every 8 hours  influenza  Vaccine (HIGH DOSE) 0.7 milliLiter(s) IntraMuscular once  sodium chloride 0.9% lock flush 3 milliLiter(s) IV Push every 8 hours    MEDICATIONS  (PRN):  acetaminophen     Tablet .. 650 milliGRAM(s) Oral every 6 hours PRN Mild Pain (1 - 3), Moderate Pain (4 - 6)  ketorolac   Injectable 15 milliGRAM(s) IV Push every 6 hours PRN Moderate Pain (4 - 6)  ondansetron Injectable 4 milliGRAM(s) IV Push every 6 hours PRN Nausea and/or Vomiting  oxyCODONE    IR 5 milliGRAM(s) Oral every 8 hours PRN Severe Pain (7 - 10)    Exam:   General: Well developed, well nourished and in no acute distress  Cardio: S2 and S1 clear and crisp   Pulm: Clear and equal breath sounds bilaterally with resonance throughout   Abd: Soft, non-distended, tenderness around incision sites, without guarding. Dressings clean and dry    10-21    140  |  104  |  13  ----------------------------<  74  4.5   |  28  |  0.7    Ca    8.3<L>      21 Oct 2022 06:00  Phos  3.9     10-21  Mg     2.0     10-21    TPro  5.1<L>  /  Alb  3.6  /  TBili  0.2  /  DBili  <0.2  /  AST  59<H>  /  ALT  45<H>  /  AlkPhos  46  10-21                            11.0   7.21  )-----------( 91       ( 21 Oct 2022 06:00 )             34.5     CAPILLARY BLOOD GLUCOSE

## 2022-10-21 NOTE — DISCHARGE NOTE PROVIDER - HOSPITAL COURSE
FROM ADMISSION H+P:   HPI:  76 y/o with PMH of Diverticulosis, Kidney stone presents to the ED with c/o 1 day hx of epigastric/RUQ pain associated with nausea but no vomiting. Denies fever/chills, cough, CP, SOB, palpitations and urinary symptoms. Last colonoscopy was ~ 3 months ago and ok per pt.  (20 Oct 2022 05:37)      ---  HOSPITAL COURSE:   Imaging findings c/w Cholecystitis. Lap nuzhat performed on 10/20/22 with surgery team; no intra-op complicatiosn noted.   Patient was medically optimized and improved clinically throughout hospital course.      Patient examined on day of discharge and found to be medically stable for discharge by Dr. Plummer to home   with outpatient follow up.        Vital Signs Last 24 Hrs  T(C): 36.5 (21 Oct 2022 04:30), Max: 36.6 (20 Oct 2022 14:00)  T(F): 97.7 (21 Oct 2022 04:30), Max: 97.8 (20 Oct 2022 14:00)  HR: 71 (21 Oct 2022 04:30) (62 - 77)  BP: 133/63 (21 Oct 2022 04:30) (119/55 - 160/72)  BP(mean): 63 (20 Oct 2022 15:45) (63 - 63)  RR: 18 (21 Oct 2022 04:30) (10 - 20)  SpO2: 100% (20 Oct 2022 18:30) (98% - 100%)    Parameters below as of 20 Oct 2022 18:30  Patient On (Oxygen Delivery Method): nasal cannula         FROM ADMISSION H+P:   HPI:  76 y/o with PMH of Diverticulosis, Kidney stone presents to the ED with c/o 1 day hx of epigastric/RUQ pain associated with nausea but no vomiting. Denies fever/chills, cough, CP, SOB, palpitations and urinary symptoms. Last colonoscopy was ~ 3 months ago and ok per pt.  (20 Oct 2022 05:37)      ---  HOSPITAL COURSE:   Imaging findings c/w Cholecystitis. Lap nuzhat performed on 10/20/22 with surgery team; no intra-op complicatiosn noted.   Patient was medically optimized and improved clinically throughout hospital course.      Patient examined on day of discharge and found to be medically stable for discharge by Dr. Plummer to home   with outpatient follow up.    no abx needed upon d/c , adequately given while Inpt.  d/c with nsaids for pain   refusing opiates

## 2022-10-21 NOTE — DISCHARGE NOTE PROVIDER - PROVIDER TOKENS
PROVIDER:[TOKEN:[[id number]],FOLLOWUP:[1 week]] PROVIDER:[TOKEN:[[id number]],FOLLOWUP:[1-3 days]]

## 2022-10-21 NOTE — DISCHARGE NOTE PROVIDER - NSDCFUADDINST_GEN_ALL_CORE_FT
Continue physical activities as tolerated at your discretion Continue physical activities as tolerated at your discretion    if any fever over 102 , call office or report to nearest ER

## 2022-10-21 NOTE — DISCHARGE NOTE PROVIDER - NSDCFUADDAPPT_GEN_ALL_CORE_FT
** Please call Dr. Plummer's office to schedule an appointment to be seen in office in 1 week for post-operation follow-up**   ** Please call Dr. Plummer's office to schedule an appointment to be seen in office on 10/24/22 for post-operation follow-up**

## 2022-10-21 NOTE — PROGRESS NOTE ADULT - ASSESSMENT
78 yo F s/p day 1 laparoscopic cholecystectomy, ambulating, urinating and tolerating diet.     Plan:   - Advance diet   - Possible d/c today   76 yo F s/p day 1 laparoscopic cholecystectomy, ambulating, urinating and tolerating diet.     Plan:   - Advance diet   - Possible d/c today in afternoon   - would like motrin 800mg upon d/c for pain , refusing opiates     d/w dr. Plummer     d/c prepped

## 2022-10-21 NOTE — DISCHARGE NOTE PROVIDER - NSDCCPCAREPLAN_GEN_ALL_CORE_FT
PRINCIPAL DISCHARGE DIAGNOSIS  Diagnosis: Cholecystitis  Assessment and Plan of Treatment: Activity: No heavy lifting > 10 lbs for 2 weeks. Avoid straining or excessive activity x 6 weeks.   Slowly increase your activity. Most patients usually take 1-3 weeks to return comfortably to normal activity.   Dressings: Remove outer dressings in 48 hours and steri strips underneath will fall off on their own. Do not scrub wounds. You may shower but do not bathe. May use ice packs for pain and swelling.   Follow up: Please call the number provided to make an appointment with Dr. Plummer's office in 1-2 weeks. Please call with any questions or concerns including fevers, worsening pain, pus from the wounds, or redness of the skin.   Call you surgeon if:  Pain will not go away or gets worse  Fever more than 101F or 38.3C  Continous vomiting  Wound redness, swelling, bleeding, or bad smelling leakage  Strong or continous abdominal pain or swelling  No bowel movements 2-3 days after operation   Pain Control  Start off by using acetaminophen or ibuprofen. Take on a regular schedule ever 6 hours. Possible side effects from long term, chronic use are stomach upset, digestive tract bleeding, and fluid retention. These side effects usually are not seen with short-term use.    Ice affected area.  Splint abdomen with pillows to aid movement or coughing.   Pain from surgical incision usually goes away after 7-10 days. By day 4, most people report no severe pain after surgery.  Following laparoscopic surgery, pain is sometimes felt in the shoulder. This is due to the gas inserted in your abdomen during the procedure. The gas is absorbed by your intestines. Moving and walking will help decrease the gas and shoulder pain.  Do not wait until your pain has reached a 10 before telling your provider.       PRINCIPAL DISCHARGE DIAGNOSIS  Diagnosis: Cholecystitis  Assessment and Plan of Treatment: Activity: No heavy lifting > 10 lbs for 2 weeks. Avoid straining or excessive activity x 6 weeks.   Slowly increase your activity. Most patients usually take 1-3 weeks to return comfortably to normal activity.   Dressings: Remove outer dressings in 48 hours and steri strips underneath will fall off on their own. Do not scrub wounds. You may shower but do not bathe. May use ice packs for pain and swelling.   Follow up: Please call the number provided to make an appointment with Dr. Plummer's office on 10/24/22. Please call with any questions or concerns including fevers, worsening pain, pus from the wounds, or redness of the skin.   Call you surgeon if:  Pain will not go away or gets worse  Fever more than 101F or 38.3C  Continous vomiting  Wound redness, swelling, bleeding, or bad smelling leakage  Strong or continous abdominal pain or swelling  Pain Control  Start off by using acetaminophen or ibuprofen. Take on a regular schedule ever 6 hours. Possible side effects from long term, chronic use are stomach upset, digestive tract bleeding, and fluid retention. These side effects usually are not seen with short-term use.    Ice affected area.  Splint abdomen with pillows to aid movement or coughing.   Pain from surgical incision usually goes away after 7-10 days. By day 4, most people report no severe pain after surgery.  Following laparoscopic surgery, pain is sometimes felt in the shoulder. This is due to the gas inserted in your abdomen during the procedure. The gas is absorbed by your intestines. Moving and walking will help decrease the gas and shoulder pain.  Do not wait until your pain has reached a 10 before telling your provider.

## 2022-10-21 NOTE — DISCHARGE NOTE PROVIDER - CARE PROVIDER_API CALL
Jacqueline Plummer)  Surgery  [address] [place] [address]  Phone: [phone]  Fax: [phone]  Follow Up Time: 1 week   Jacqueline Plummer)  Surgery  [address] [place] [address]  Phone: [phone]  Fax: [phone]  Follow Up Time: 1-3 days

## 2022-10-21 NOTE — DISCHARGE NOTE PROVIDER - NSDCACTIVITY_GEN_ALL_CORE
Follow Instructions Provided by your Surgical Team Showering allowed/Walking - Indoors allowed/No heavy lifting/straining/Walking - Outdoors allowed/Follow Instructions Provided by your Surgical Team

## 2022-10-21 NOTE — DISCHARGE NOTE NURSING/CASE MANAGEMENT/SOCIAL WORK - NSDCPEFALRISK_GEN_ALL_CORE
For information on Fall & Injury Prevention, visit: [email or web address] OR  [email or web address] OR  https://www.cdc.gov/steadi/patient.html

## 2022-10-21 NOTE — DISCHARGE NOTE PROVIDER - NSDCMRMEDTOKEN_GEN_ALL_CORE_FT
aspirin 81 mg oral tablet: 1 tab(s) orally once a day   acetaminophen 325 mg oral tablet: 2 tab(s) orally every 6 hours, As needed, Mild Pain (1 - 3), Moderate Pain (4 - 6)  aspirin 81 mg oral tablet: 1 tab(s) orally once a day   mg oral tablet: 1 tab(s) orally every 8 hours, As Needed -for severe pain

## 2022-10-21 NOTE — DISCHARGE NOTE NURSING/CASE MANAGEMENT/SOCIAL WORK - NSDCFUADDAPPT_GEN_ALL_CORE_FT
** Please call Dr. Plummer's office to schedule an appointment to be seen in office on 10/24/22 for post-operation follow-up**

## 2022-10-21 NOTE — DISCHARGE NOTE NURSING/CASE MANAGEMENT/SOCIAL WORK - PATIENT PORTAL LINK FT
You can access the FollowMyHealth Patient Portal offered by [facility] by registering at the following website: [email or web address]. By joining [employer]’s FollowMyHealth portal, you will also be able to view your health information using other applications (apps) compatible with our system.

## 2022-10-25 LAB — SURGICAL PATHOLOGY STUDY: SIGNIFICANT CHANGE UP

## 2022-11-17 DIAGNOSIS — Z79.82 LONG TERM (CURRENT) USE OF ASPIRIN: ICD-10-CM

## 2022-11-17 DIAGNOSIS — K57.92 DIVERTICULITIS OF INTESTINE, PART UNSPECIFIED, WITHOUT PERFORATION OR ABSCESS WITHOUT BLEEDING: ICD-10-CM

## 2022-11-17 DIAGNOSIS — R10.11 RIGHT UPPER QUADRANT PAIN: ICD-10-CM

## 2022-11-17 DIAGNOSIS — K81.9 CHOLECYSTITIS, UNSPECIFIED: ICD-10-CM

## 2022-11-17 DIAGNOSIS — R11.0 NAUSEA: ICD-10-CM

## 2022-11-17 DIAGNOSIS — Z20.822 CONTACT WITH AND (SUSPECTED) EXPOSURE TO COVID-19: ICD-10-CM

## 2022-12-09 NOTE — ED PROVIDER NOTE - NSBENEFITOFTRANSFER_ED_A_ED
[facility]      Rooming information:  Approximate last use of full opioid agonist: 7/13/22  Taking buprenorphine? Yes:  As prescribed? Yes: got some from a friend  Number of buprenorphine films/tablets remaining currently: 0  Side effects related to buprenorphine (constipation, dry mouth, sedation?) No   Narcan currently available: Yes  Other recent substance use:    Denies  NICOTINE-Yes:   If using nicotine, ready to quit? Yes:     Point of care urine drug screen positive for:  Urine Drug Screen Results  AMP: Negative  BAR: Negative  BUP: Positive  BZO: Negative  HIREN: Negative  mAMP: Negative  MDMA : Negative  MTD: Negative  QTL615: Negative  OXY: Negative  PCP : Negative  THC : Negative  *POC urine drug screen does not screen for Fentanyl    Pregnancy Status   LMP: about 2 weeks ago  Birth control/barriers: pill  Urine pregnancy test specimen obtained and sent to lab:yes    PHQ Assesment Total Score(s) 12/9/2022   PHQ-9 Score 4   Some recent data might be hidden       If PHQ-9 score of 15 or higher, has [facility] therapist or provider been notified? N/A    Any current suicidal ideation? No  If yes, has [facility] therapist or provider been notified? N/A    Primary care provider: No primary care provider on file.     Mental health provider: denies (follow up on MH referral if needed)    Insurance needs: active    Housing needs: st[place]    Contact information up to date? yes    3rd Party Involvement none today (please obtain WILMER if pt would like to include)    Patricia Khan CMA  December 9, 2022  3:13 PM    
Obtain Level of Care/Service Not Available at this Facility

## 2022-12-23 ENCOUNTER — OUTPATIENT (OUTPATIENT)
Dept: OUTPATIENT SERVICES | Facility: HOSPITAL | Age: 77
LOS: 1 days | Discharge: HOME | End: 2022-12-23

## 2022-12-23 DIAGNOSIS — R91.1 SOLITARY PULMONARY NODULE: ICD-10-CM

## 2022-12-23 PROCEDURE — 71260 CT THORAX DX C+: CPT | Mod: 26,MH

## 2022-12-29 NOTE — ED PROVIDER NOTE - CADM POA CENTRAL LINE
Visit Information    Have you changed or started any medications since your last visit including any over-the-counter medicines, vitamins, or herbal medicines? no   Are you having any side effects from any of your medications? -  no  Have you stopped taking any of your medications? Is so, why? -  no    Have you seen any other physician or provider since your last visit? No  Have you had any other diagnostic tests since your last visit? No  Have you been seen in the emergency room and/or had an admission to a hospital since we last saw you? No  Have you had your routine dental cleaning in the past 6 months? no    Have you activated your [employer] account? If not, what are your barriers?  Yes     Patient Care Team:  Alyssa Rodriguez MD as PCP - General (Family Medicine)  Neli Cheema MD as Surgeon (Orthopedic Surgery)    Medical History Review  Past Medical, Family, and Social History reviewed and does not contribute to the patient presenting condition    Health Maintenance   Topic Date Due    Diabetic Alb to Cr ratio (uACR) test  Never done    Hepatitis B vaccine (1 of 3 - Risk 3-dose series) Never done    Shingles vaccine (1 of 2) Never done    Diabetic retinal exam  01/01/2014    Pneumococcal 65+ years Vaccine (2 - PCV) 01/07/2016    Hepatitis A vaccine (2 of 2 - Risk 2-dose series) 02/17/2021    Diabetic foot exam  08/17/2021    COVID-19 Vaccine (4 - [facility]) 01/30/2022    Annual Wellness Visit (AWV)  Never done    Flu vaccine (1) 08/01/2022    Breast cancer screen  08/20/2022    Depression Monitoring  02/01/2023    Lipids  02/17/2023    A1C test (Diabetic or Prediabetic)  11/18/2023    GFR test (Diabetes, CKD 3-4, OR last GFR 15-59)  12/04/2023    DTaP/Tdap/Td vaccine (6 - Td or Tdap) 06/27/2024    Colorectal Cancer Screen  08/01/2024    DEXA (modify frequency per FRAX score)  Completed    Hepatitis C screen  Completed    Hib vaccine  Aged Out    Meningococcal (ACWY) vaccine  Aged Out No

## 2023-07-31 NOTE — ED PROVIDER NOTE - INPATIENT RESIDENT/ACP NOTIFIED DATE
Anesthesia Type: 1% lidocaine with epinephrine and a 1:10 solution of 8.4% sodium bicarbonate 20-Oct-2022 05:24

## 2023-08-09 NOTE — ED ADULT NURSE NOTE - PAIN: PRESENCE, MLM
Take 2 tabs of advil every 8 hours for 3-5 days with food  Physical therapy     
complains of pain/discomfort

## 2023-10-18 ENCOUNTER — OUTPATIENT (OUTPATIENT)
Dept: OUTPATIENT SERVICES | Facility: HOSPITAL | Age: 78
LOS: 1 days | End: 2023-10-18
Payer: MEDICARE

## 2023-10-18 VITALS
TEMPERATURE: 98 F | HEIGHT: 65 IN | WEIGHT: 106.04 LBS | DIASTOLIC BLOOD PRESSURE: 63 MMHG | OXYGEN SATURATION: 98 % | RESPIRATION RATE: 16 BRPM | HEART RATE: 72 BPM | SYSTOLIC BLOOD PRESSURE: 141 MMHG

## 2023-10-18 DIAGNOSIS — I65.29 OCCLUSION AND STENOSIS OF UNSPECIFIED CAROTID ARTERY: ICD-10-CM

## 2023-10-18 DIAGNOSIS — Z90.49 ACQUIRED ABSENCE OF OTHER SPECIFIED PARTS OF DIGESTIVE TRACT: Chronic | ICD-10-CM

## 2023-10-18 DIAGNOSIS — Z01.818 ENCOUNTER FOR OTHER PREPROCEDURAL EXAMINATION: ICD-10-CM

## 2023-10-18 LAB
APTT BLD: 30.9 SEC — SIGNIFICANT CHANGE UP (ref 27–39.2)
APTT BLD: 30.9 SEC — SIGNIFICANT CHANGE UP (ref 27–39.2)
BLD GP AB SCN SERPL QL: SIGNIFICANT CHANGE UP
BLD GP AB SCN SERPL QL: SIGNIFICANT CHANGE UP
INR BLD: 0.93 RATIO — SIGNIFICANT CHANGE UP (ref 0.65–1.3)
INR BLD: 0.93 RATIO — SIGNIFICANT CHANGE UP (ref 0.65–1.3)
PROTHROM AB SERPL-ACNC: 10.6 SEC — SIGNIFICANT CHANGE UP (ref 9.95–12.87)
PROTHROM AB SERPL-ACNC: 10.6 SEC — SIGNIFICANT CHANGE UP (ref 9.95–12.87)

## 2023-10-18 PROCEDURE — 86900 BLOOD TYPING SEROLOGIC ABO: CPT

## 2023-10-18 PROCEDURE — 85730 THROMBOPLASTIN TIME PARTIAL: CPT

## 2023-10-18 PROCEDURE — 99214 OFFICE O/P EST MOD 30 MIN: CPT | Mod: 25

## 2023-10-18 PROCEDURE — 93005 ELECTROCARDIOGRAM TRACING: CPT

## 2023-10-18 PROCEDURE — 86850 RBC ANTIBODY SCREEN: CPT

## 2023-10-18 PROCEDURE — 85610 PROTHROMBIN TIME: CPT

## 2023-10-18 PROCEDURE — 93010 ELECTROCARDIOGRAM REPORT: CPT

## 2023-10-18 PROCEDURE — 86901 BLOOD TYPING SEROLOGIC RH(D): CPT

## 2023-10-18 PROCEDURE — 36415 COLL VENOUS BLD VENIPUNCTURE: CPT

## 2023-10-18 RX ORDER — ASPIRIN/CALCIUM CARB/MAGNESIUM 324 MG
1 TABLET ORAL
Qty: 0 | Refills: 0 | DISCHARGE

## 2023-10-18 RX ORDER — ROSUVASTATIN CALCIUM 5 MG/1
1 TABLET ORAL
Refills: 0 | DISCHARGE

## 2023-10-18 NOTE — H&P PST ADULT - HISTORY OF PRESENT ILLNESS
Pt states she went for regular check up with PCP. Had carotid doppler done which showed stenosis. Went for MRA which determined 72% blockage in right carotid. Pt denies any symptoms. Advised to proceed with above.    Denies any chest pain, difficulty breathing, SOB, palpitations, dysuria, URI, or any other infections in the last 2 weeks. Denies any recent travel, contact, or exposure to any persons with known or suspected COVID-19. Denies any suicidal or homicidal ideations. CARMEN reviewed with patient.     Endorses this is their full medical & surgical history including medications prescribed. Pt verbalized understanding of all pre-operative instructions and was given the opportunity to ask questions and have them answered. They were instructed to follow up with their surgeon/proceduralists office with any additional questions regarding their procedure.    Anesthesia Alert  YES--Difficult Airway: Class IV  NO--History of neck surgery or radiation  NO--Limited ROM of neck  NO--History of Malignant hyperthermia  NO--No personal or family history of Pseudocholinesterase deficiency.  NO--Prior Anesthesia Complication  NO--Latex Allergy  NO--Loose teeth  NO--History of Rheumatoid Arthritis  NO--CARMEN  NO--Bleeding Risk  NO--Other_____    Revised Cardiac Risk Index for Pre-Operative Risk  RESULT SUMMARY:  1 points  Class II Risk    6.0 %  30-day risk of death, MI, or cardiac arrest    From [employer] 2017, based on pooled data from 5 high quality external validations (4 prospective). These numbers are higher than those often quoted from the now-outdated original study (Sundar 1999). See Evidence for details.    INPUTS:  Elevated-risk surgery —> 1 = Yes  History of ischemic heart disease —> 0 = No  History of congestive heart failure —> 0 = No  History of cerebrovascular disease —> 0 = No  Pre-operative treatment with insulin —> 0 = No  Pre-operative creatinine >2 mg/dL / 176.8 µmol/L —> 0 = No    [facility] Activity Status Index (DASI)  RESULT SUMMARY:  42.7 points  The higher the score (maximum 58.2), the higher the functional status.    7.99 METs    INPUTS:  Take care of self —> 2.75 = Yes  Walk indoors —> 1.75 = Yes  Walk 1&ndash;2 blocks on level ground —> 2.75 = Yes  Climb a flight of stairs or walk up a hill —> 5.5 = Yes  Run a short distance —> 0 = No  Do light work around the house —> 2.7 = Yes  Do moderate work around the house —> 3.5 = Yes  Do heavy work around the house —> 8 = Yes  Do yardwork —> 4.5 = Yes  Have sexual relations —> 5.25 = Yes  Participate in moderate recreational activities —> 6 = Yes  Participate in strenuous sports —> 0 = No

## 2023-10-18 NOTE — H&P PST ADULT - REASON FOR ADMISSION
77 yo female presents for PAST in preparation for right carotid endarterectomy on 10/25/2023 under general anesthesia by Dr. Ervin ([facility]).

## 2023-10-19 DIAGNOSIS — I65.29 OCCLUSION AND STENOSIS OF UNSPECIFIED CAROTID ARTERY: ICD-10-CM

## 2023-10-19 DIAGNOSIS — Z01.818 ENCOUNTER FOR OTHER PREPROCEDURAL EXAMINATION: ICD-10-CM

## 2023-10-25 ENCOUNTER — TRANSCRIPTION ENCOUNTER (OUTPATIENT)
Age: 78
End: 2023-10-25

## 2023-10-25 ENCOUNTER — INPATIENT (INPATIENT)
Facility: HOSPITAL | Age: 78
LOS: 0 days | Discharge: HOME | DRG: 39 | End: 2023-10-26
Attending: SURGERY | Admitting: SURGERY
Payer: MEDICARE

## 2023-10-25 VITALS
WEIGHT: 108.03 LBS | OXYGEN SATURATION: 99 % | TEMPERATURE: 98 F | HEART RATE: 62 BPM | SYSTOLIC BLOOD PRESSURE: 110 MMHG | HEIGHT: 66 IN | DIASTOLIC BLOOD PRESSURE: 53 MMHG | RESPIRATION RATE: 18 BRPM

## 2023-10-25 DIAGNOSIS — I65.29 OCCLUSION AND STENOSIS OF UNSPECIFIED CAROTID ARTERY: ICD-10-CM

## 2023-10-25 DIAGNOSIS — Z90.49 ACQUIRED ABSENCE OF OTHER SPECIFIED PARTS OF DIGESTIVE TRACT: Chronic | ICD-10-CM

## 2023-10-25 PROBLEM — Z00.00 ENCOUNTER FOR PREVENTIVE HEALTH EXAMINATION: Status: ACTIVE | Noted: 2023-10-25

## 2023-10-25 LAB
ANION GAP SERPL CALC-SCNC: 11 MMOL/L — SIGNIFICANT CHANGE UP (ref 7–14)
ANION GAP SERPL CALC-SCNC: 11 MMOL/L — SIGNIFICANT CHANGE UP (ref 7–14)
ANION GAP SERPL CALC-SCNC: 13 MMOL/L — SIGNIFICANT CHANGE UP (ref 7–14)
ANION GAP SERPL CALC-SCNC: 13 MMOL/L — SIGNIFICANT CHANGE UP (ref 7–14)
BASOPHILS # BLD AUTO: 0.04 K/UL — SIGNIFICANT CHANGE UP (ref 0–0.2)
BASOPHILS # BLD AUTO: 0.04 K/UL — SIGNIFICANT CHANGE UP (ref 0–0.2)
BASOPHILS # BLD AUTO: 0.05 K/UL — SIGNIFICANT CHANGE UP (ref 0–0.2)
BASOPHILS # BLD AUTO: 0.05 K/UL — SIGNIFICANT CHANGE UP (ref 0–0.2)
BASOPHILS NFR BLD AUTO: 0.4 % — SIGNIFICANT CHANGE UP (ref 0–1)
BASOPHILS NFR BLD AUTO: 0.4 % — SIGNIFICANT CHANGE UP (ref 0–1)
BASOPHILS NFR BLD AUTO: 0.7 % — SIGNIFICANT CHANGE UP (ref 0–1)
BASOPHILS NFR BLD AUTO: 0.7 % — SIGNIFICANT CHANGE UP (ref 0–1)
BUN SERPL-MCNC: 13 MG/DL — SIGNIFICANT CHANGE UP (ref 10–20)
BUN SERPL-MCNC: 13 MG/DL — SIGNIFICANT CHANGE UP (ref 10–20)
BUN SERPL-MCNC: 14 MG/DL — SIGNIFICANT CHANGE UP (ref 10–20)
BUN SERPL-MCNC: 14 MG/DL — SIGNIFICANT CHANGE UP (ref 10–20)
CALCIUM SERPL-MCNC: 8.4 MG/DL — SIGNIFICANT CHANGE UP (ref 8.4–10.5)
CALCIUM SERPL-MCNC: 8.4 MG/DL — SIGNIFICANT CHANGE UP (ref 8.4–10.5)
CALCIUM SERPL-MCNC: 8.5 MG/DL — SIGNIFICANT CHANGE UP (ref 8.4–10.5)
CALCIUM SERPL-MCNC: 8.5 MG/DL — SIGNIFICANT CHANGE UP (ref 8.4–10.5)
CHLORIDE SERPL-SCNC: 102 MMOL/L — SIGNIFICANT CHANGE UP (ref 98–110)
CHLORIDE SERPL-SCNC: 102 MMOL/L — SIGNIFICANT CHANGE UP (ref 98–110)
CHLORIDE SERPL-SCNC: 103 MMOL/L — SIGNIFICANT CHANGE UP (ref 98–110)
CHLORIDE SERPL-SCNC: 103 MMOL/L — SIGNIFICANT CHANGE UP (ref 98–110)
CK MB CFR SERPL CALC: 5.9 NG/ML — SIGNIFICANT CHANGE UP (ref 0.6–6.3)
CK MB CFR SERPL CALC: 5.9 NG/ML — SIGNIFICANT CHANGE UP (ref 0.6–6.3)
CK SERPL-CCNC: 106 U/L — SIGNIFICANT CHANGE UP (ref 0–225)
CK SERPL-CCNC: 106 U/L — SIGNIFICANT CHANGE UP (ref 0–225)
CO2 SERPL-SCNC: 23 MMOL/L — SIGNIFICANT CHANGE UP (ref 17–32)
CO2 SERPL-SCNC: 23 MMOL/L — SIGNIFICANT CHANGE UP (ref 17–32)
CO2 SERPL-SCNC: 26 MMOL/L — SIGNIFICANT CHANGE UP (ref 17–32)
CO2 SERPL-SCNC: 26 MMOL/L — SIGNIFICANT CHANGE UP (ref 17–32)
CREAT SERPL-MCNC: 0.6 MG/DL — LOW (ref 0.7–1.5)
CREAT SERPL-MCNC: 0.6 MG/DL — LOW (ref 0.7–1.5)
CREAT SERPL-MCNC: 0.7 MG/DL — SIGNIFICANT CHANGE UP (ref 0.7–1.5)
CREAT SERPL-MCNC: 0.7 MG/DL — SIGNIFICANT CHANGE UP (ref 0.7–1.5)
EGFR: 90 ML/MIN/1.73M2 — SIGNIFICANT CHANGE UP
EGFR: 90 ML/MIN/1.73M2 — SIGNIFICANT CHANGE UP
EGFR: 93 ML/MIN/1.73M2 — SIGNIFICANT CHANGE UP
EGFR: 93 ML/MIN/1.73M2 — SIGNIFICANT CHANGE UP
EOSINOPHIL # BLD AUTO: 0 K/UL — SIGNIFICANT CHANGE UP (ref 0–0.7)
EOSINOPHIL # BLD AUTO: 0 K/UL — SIGNIFICANT CHANGE UP (ref 0–0.7)
EOSINOPHIL # BLD AUTO: 0.05 K/UL — SIGNIFICANT CHANGE UP (ref 0–0.7)
EOSINOPHIL # BLD AUTO: 0.05 K/UL — SIGNIFICANT CHANGE UP (ref 0–0.7)
EOSINOPHIL NFR BLD AUTO: 0 % — SIGNIFICANT CHANGE UP (ref 0–8)
EOSINOPHIL NFR BLD AUTO: 0 % — SIGNIFICANT CHANGE UP (ref 0–8)
EOSINOPHIL NFR BLD AUTO: 0.7 % — SIGNIFICANT CHANGE UP (ref 0–8)
EOSINOPHIL NFR BLD AUTO: 0.7 % — SIGNIFICANT CHANGE UP (ref 0–8)
GLUCOSE BLDC GLUCOMTR-MCNC: 141 MG/DL — HIGH (ref 70–99)
GLUCOSE BLDC GLUCOMTR-MCNC: 141 MG/DL — HIGH (ref 70–99)
GLUCOSE SERPL-MCNC: 144 MG/DL — HIGH (ref 70–99)
GLUCOSE SERPL-MCNC: 144 MG/DL — HIGH (ref 70–99)
GLUCOSE SERPL-MCNC: 199 MG/DL — HIGH (ref 70–99)
GLUCOSE SERPL-MCNC: 199 MG/DL — HIGH (ref 70–99)
HCT VFR BLD CALC: 34.2 % — LOW (ref 37–47)
HCT VFR BLD CALC: 34.2 % — LOW (ref 37–47)
HCT VFR BLD CALC: 34.8 % — LOW (ref 37–47)
HCT VFR BLD CALC: 34.8 % — LOW (ref 37–47)
HGB BLD-MCNC: 11.2 G/DL — LOW (ref 12–16)
HGB BLD-MCNC: 11.2 G/DL — LOW (ref 12–16)
HGB BLD-MCNC: 11.4 G/DL — LOW (ref 12–16)
HGB BLD-MCNC: 11.4 G/DL — LOW (ref 12–16)
IMM GRANULOCYTES NFR BLD AUTO: 0.4 % — HIGH (ref 0.1–0.3)
IMM GRANULOCYTES NFR BLD AUTO: 0.4 % — HIGH (ref 0.1–0.3)
IMM GRANULOCYTES NFR BLD AUTO: 0.5 % — HIGH (ref 0.1–0.3)
IMM GRANULOCYTES NFR BLD AUTO: 0.5 % — HIGH (ref 0.1–0.3)
LYMPHOCYTES # BLD AUTO: 0.86 K/UL — LOW (ref 1.2–3.4)
LYMPHOCYTES # BLD AUTO: 0.86 K/UL — LOW (ref 1.2–3.4)
LYMPHOCYTES # BLD AUTO: 1.09 K/UL — LOW (ref 1.2–3.4)
LYMPHOCYTES # BLD AUTO: 1.09 K/UL — LOW (ref 1.2–3.4)
LYMPHOCYTES # BLD AUTO: 14.7 % — LOW (ref 20.5–51.1)
LYMPHOCYTES # BLD AUTO: 14.7 % — LOW (ref 20.5–51.1)
LYMPHOCYTES # BLD AUTO: 8 % — LOW (ref 20.5–51.1)
LYMPHOCYTES # BLD AUTO: 8 % — LOW (ref 20.5–51.1)
MAGNESIUM SERPL-MCNC: 1.9 MG/DL — SIGNIFICANT CHANGE UP (ref 1.8–2.4)
MAGNESIUM SERPL-MCNC: 1.9 MG/DL — SIGNIFICANT CHANGE UP (ref 1.8–2.4)
MAGNESIUM SERPL-MCNC: 2.2 MG/DL — SIGNIFICANT CHANGE UP (ref 1.8–2.4)
MAGNESIUM SERPL-MCNC: 2.2 MG/DL — SIGNIFICANT CHANGE UP (ref 1.8–2.4)
MCHC RBC-ENTMCNC: 30.1 PG — SIGNIFICANT CHANGE UP (ref 27–31)
MCHC RBC-ENTMCNC: 30.1 PG — SIGNIFICANT CHANGE UP (ref 27–31)
MCHC RBC-ENTMCNC: 30.3 PG — SIGNIFICANT CHANGE UP (ref 27–31)
MCHC RBC-ENTMCNC: 30.3 PG — SIGNIFICANT CHANGE UP (ref 27–31)
MCHC RBC-ENTMCNC: 32.7 G/DL — SIGNIFICANT CHANGE UP (ref 32–37)
MCHC RBC-ENTMCNC: 32.7 G/DL — SIGNIFICANT CHANGE UP (ref 32–37)
MCHC RBC-ENTMCNC: 32.8 G/DL — SIGNIFICANT CHANGE UP (ref 32–37)
MCHC RBC-ENTMCNC: 32.8 G/DL — SIGNIFICANT CHANGE UP (ref 32–37)
MCV RBC AUTO: 91.9 FL — SIGNIFICANT CHANGE UP (ref 81–99)
MCV RBC AUTO: 91.9 FL — SIGNIFICANT CHANGE UP (ref 81–99)
MCV RBC AUTO: 92.6 FL — SIGNIFICANT CHANGE UP (ref 81–99)
MCV RBC AUTO: 92.6 FL — SIGNIFICANT CHANGE UP (ref 81–99)
MONOCYTES # BLD AUTO: 0.11 K/UL — SIGNIFICANT CHANGE UP (ref 0.1–0.6)
MONOCYTES # BLD AUTO: 0.11 K/UL — SIGNIFICANT CHANGE UP (ref 0.1–0.6)
MONOCYTES # BLD AUTO: 0.45 K/UL — SIGNIFICANT CHANGE UP (ref 0.1–0.6)
MONOCYTES # BLD AUTO: 0.45 K/UL — SIGNIFICANT CHANGE UP (ref 0.1–0.6)
MONOCYTES NFR BLD AUTO: 1.5 % — LOW (ref 1.7–9.3)
MONOCYTES NFR BLD AUTO: 1.5 % — LOW (ref 1.7–9.3)
MONOCYTES NFR BLD AUTO: 4.2 % — SIGNIFICANT CHANGE UP (ref 1.7–9.3)
MONOCYTES NFR BLD AUTO: 4.2 % — SIGNIFICANT CHANGE UP (ref 1.7–9.3)
NEUTROPHILS # BLD AUTO: 6.09 K/UL — SIGNIFICANT CHANGE UP (ref 1.4–6.5)
NEUTROPHILS # BLD AUTO: 6.09 K/UL — SIGNIFICANT CHANGE UP (ref 1.4–6.5)
NEUTROPHILS # BLD AUTO: 9.37 K/UL — HIGH (ref 1.4–6.5)
NEUTROPHILS # BLD AUTO: 9.37 K/UL — HIGH (ref 1.4–6.5)
NEUTROPHILS NFR BLD AUTO: 82 % — HIGH (ref 42.2–75.2)
NEUTROPHILS NFR BLD AUTO: 82 % — HIGH (ref 42.2–75.2)
NEUTROPHILS NFR BLD AUTO: 86.9 % — HIGH (ref 42.2–75.2)
NEUTROPHILS NFR BLD AUTO: 86.9 % — HIGH (ref 42.2–75.2)
NRBC # BLD: 0 /100 WBCS — SIGNIFICANT CHANGE UP (ref 0–0)
PHOSPHATE SERPL-MCNC: 3.7 MG/DL — SIGNIFICANT CHANGE UP (ref 2.1–4.9)
PLATELET # BLD AUTO: 161 K/UL — SIGNIFICANT CHANGE UP (ref 130–400)
PLATELET # BLD AUTO: 161 K/UL — SIGNIFICANT CHANGE UP (ref 130–400)
PLATELET # BLD AUTO: 187 K/UL — SIGNIFICANT CHANGE UP (ref 130–400)
PLATELET # BLD AUTO: 187 K/UL — SIGNIFICANT CHANGE UP (ref 130–400)
PMV BLD: 10.8 FL — HIGH (ref 7.4–10.4)
PMV BLD: 10.8 FL — HIGH (ref 7.4–10.4)
PMV BLD: 11.1 FL — HIGH (ref 7.4–10.4)
PMV BLD: 11.1 FL — HIGH (ref 7.4–10.4)
POTASSIUM SERPL-MCNC: 4 MMOL/L — SIGNIFICANT CHANGE UP (ref 3.5–5)
POTASSIUM SERPL-MCNC: 4 MMOL/L — SIGNIFICANT CHANGE UP (ref 3.5–5)
POTASSIUM SERPL-MCNC: 4.2 MMOL/L — SIGNIFICANT CHANGE UP (ref 3.5–5)
POTASSIUM SERPL-MCNC: 4.2 MMOL/L — SIGNIFICANT CHANGE UP (ref 3.5–5)
POTASSIUM SERPL-SCNC: 4 MMOL/L — SIGNIFICANT CHANGE UP (ref 3.5–5)
POTASSIUM SERPL-SCNC: 4 MMOL/L — SIGNIFICANT CHANGE UP (ref 3.5–5)
POTASSIUM SERPL-SCNC: 4.2 MMOL/L — SIGNIFICANT CHANGE UP (ref 3.5–5)
POTASSIUM SERPL-SCNC: 4.2 MMOL/L — SIGNIFICANT CHANGE UP (ref 3.5–5)
RBC # BLD: 3.72 M/UL — LOW (ref 4.2–5.4)
RBC # BLD: 3.72 M/UL — LOW (ref 4.2–5.4)
RBC # BLD: 3.76 M/UL — LOW (ref 4.2–5.4)
RBC # BLD: 3.76 M/UL — LOW (ref 4.2–5.4)
RBC # FLD: 13.6 % — SIGNIFICANT CHANGE UP (ref 11.5–14.5)
RBC # FLD: 13.6 % — SIGNIFICANT CHANGE UP (ref 11.5–14.5)
RBC # FLD: 13.7 % — SIGNIFICANT CHANGE UP (ref 11.5–14.5)
RBC # FLD: 13.7 % — SIGNIFICANT CHANGE UP (ref 11.5–14.5)
SODIUM SERPL-SCNC: 139 MMOL/L — SIGNIFICANT CHANGE UP (ref 135–146)
TROPONIN T SERPL-MCNC: <0.01 NG/ML — SIGNIFICANT CHANGE UP
TROPONIN T SERPL-MCNC: <0.01 NG/ML — SIGNIFICANT CHANGE UP
WBC # BLD: 10.77 K/UL — SIGNIFICANT CHANGE UP (ref 4.8–10.8)
WBC # BLD: 10.77 K/UL — SIGNIFICANT CHANGE UP (ref 4.8–10.8)
WBC # BLD: 7.42 K/UL — SIGNIFICANT CHANGE UP (ref 4.8–10.8)
WBC # BLD: 7.42 K/UL — SIGNIFICANT CHANGE UP (ref 4.8–10.8)
WBC # FLD AUTO: 10.77 K/UL — SIGNIFICANT CHANGE UP (ref 4.8–10.8)
WBC # FLD AUTO: 10.77 K/UL — SIGNIFICANT CHANGE UP (ref 4.8–10.8)
WBC # FLD AUTO: 7.42 K/UL — SIGNIFICANT CHANGE UP (ref 4.8–10.8)
WBC # FLD AUTO: 7.42 K/UL — SIGNIFICANT CHANGE UP (ref 4.8–10.8)

## 2023-10-25 PROCEDURE — 99291 CRITICAL CARE FIRST HOUR: CPT | Mod: 24,25

## 2023-10-25 PROCEDURE — C1768: CPT

## 2023-10-25 PROCEDURE — 83735 ASSAY OF MAGNESIUM: CPT

## 2023-10-25 PROCEDURE — 84100 ASSAY OF PHOSPHORUS: CPT

## 2023-10-25 PROCEDURE — 88304 TISSUE EXAM BY PATHOLOGIST: CPT | Mod: 26

## 2023-10-25 PROCEDURE — 88304 TISSUE EXAM BY PATHOLOGIST: CPT

## 2023-10-25 PROCEDURE — 82962 GLUCOSE BLOOD TEST: CPT

## 2023-10-25 PROCEDURE — 80048 BASIC METABOLIC PNL TOTAL CA: CPT

## 2023-10-25 PROCEDURE — C1889: CPT

## 2023-10-25 PROCEDURE — 84484 ASSAY OF TROPONIN QUANT: CPT

## 2023-10-25 PROCEDURE — 88311 DECALCIFY TISSUE: CPT | Mod: 26

## 2023-10-25 PROCEDURE — 85025 COMPLETE CBC W/AUTO DIFF WBC: CPT

## 2023-10-25 PROCEDURE — 82550 ASSAY OF CK (CPK): CPT

## 2023-10-25 PROCEDURE — 71045 X-RAY EXAM CHEST 1 VIEW: CPT

## 2023-10-25 PROCEDURE — 88311 DECALCIFY TISSUE: CPT

## 2023-10-25 PROCEDURE — 36415 COLL VENOUS BLD VENIPUNCTURE: CPT

## 2023-10-25 PROCEDURE — 82553 CREATINE MB FRACTION: CPT

## 2023-10-25 PROCEDURE — C9399: CPT

## 2023-10-25 PROCEDURE — 71045 X-RAY EXAM CHEST 1 VIEW: CPT | Mod: 26

## 2023-10-25 RX ORDER — OXYCODONE HYDROCHLORIDE 5 MG/1
5 TABLET ORAL EVERY 6 HOURS
Refills: 0 | Status: DISCONTINUED | OUTPATIENT
Start: 2023-10-25 | End: 2023-10-26

## 2023-10-25 RX ORDER — GABAPENTIN 400 MG/1
100 CAPSULE ORAL THREE TIMES A DAY
Refills: 0 | Status: DISCONTINUED | OUTPATIENT
Start: 2023-10-25 | End: 2023-10-26

## 2023-10-25 RX ORDER — MORPHINE SULFATE 50 MG/1
4 CAPSULE, EXTENDED RELEASE ORAL
Refills: 0 | Status: DISCONTINUED | OUTPATIENT
Start: 2023-10-25 | End: 2023-10-25

## 2023-10-25 RX ORDER — ASPIRIN/CALCIUM CARB/MAGNESIUM 324 MG
81 TABLET ORAL DAILY
Refills: 0 | Status: DISCONTINUED | OUTPATIENT
Start: 2023-10-26 | End: 2023-10-26

## 2023-10-25 RX ORDER — KETOROLAC TROMETHAMINE 30 MG/ML
15 SYRINGE (ML) INJECTION ONCE
Refills: 0 | Status: DISCONTINUED | OUTPATIENT
Start: 2023-10-25 | End: 2023-10-25

## 2023-10-25 RX ORDER — ATORVASTATIN CALCIUM 80 MG/1
40 TABLET, FILM COATED ORAL AT BEDTIME
Refills: 0 | Status: DISCONTINUED | OUTPATIENT
Start: 2023-10-25 | End: 2023-10-26

## 2023-10-25 RX ORDER — ACETAMINOPHEN 500 MG
1000 TABLET ORAL EVERY 6 HOURS
Refills: 0 | Status: DISCONTINUED | OUTPATIENT
Start: 2023-10-25 | End: 2023-10-26

## 2023-10-25 RX ORDER — HYDRALAZINE HCL 50 MG
5 TABLET ORAL ONCE
Refills: 0 | Status: COMPLETED | OUTPATIENT
Start: 2023-10-25 | End: 2023-10-25

## 2023-10-25 RX ORDER — SODIUM CHLORIDE 9 MG/ML
1000 INJECTION, SOLUTION INTRAVENOUS
Refills: 0 | Status: DISCONTINUED | OUTPATIENT
Start: 2023-10-25 | End: 2023-10-26

## 2023-10-25 RX ORDER — SODIUM CHLORIDE 9 MG/ML
1000 INJECTION, SOLUTION INTRAVENOUS
Refills: 0 | Status: DISCONTINUED | OUTPATIENT
Start: 2023-10-25 | End: 2023-10-25

## 2023-10-25 RX ORDER — POLYETHYLENE GLYCOL 3350 17 G/17G
17 POWDER, FOR SOLUTION ORAL DAILY
Refills: 0 | Status: DISCONTINUED | OUTPATIENT
Start: 2023-10-26 | End: 2023-10-26

## 2023-10-25 RX ORDER — ASPIRIN/CALCIUM CARB/MAGNESIUM 324 MG
81 TABLET ORAL ONCE
Refills: 0 | Status: COMPLETED | OUTPATIENT
Start: 2023-10-25 | End: 2023-10-25

## 2023-10-25 RX ORDER — CALCIUM CARBONATE 500(1250)
1 TABLET ORAL ONCE
Refills: 0 | Status: COMPLETED | OUTPATIENT
Start: 2023-10-25 | End: 2023-10-25

## 2023-10-25 RX ORDER — NICARDIPINE HYDROCHLORIDE 30 MG/1
5 CAPSULE, EXTENDED RELEASE ORAL
Qty: 40 | Refills: 0 | Status: DISCONTINUED | OUTPATIENT
Start: 2023-10-25 | End: 2023-10-25

## 2023-10-25 RX ORDER — KETOROLAC TROMETHAMINE 30 MG/ML
15 SYRINGE (ML) INJECTION EVERY 8 HOURS
Refills: 0 | Status: DISCONTINUED | OUTPATIENT
Start: 2023-10-25 | End: 2023-10-26

## 2023-10-25 RX ORDER — ONDANSETRON 8 MG/1
4 TABLET, FILM COATED ORAL EVERY 6 HOURS
Refills: 0 | Status: DISCONTINUED | OUTPATIENT
Start: 2023-10-25 | End: 2023-10-25

## 2023-10-25 RX ORDER — MAGNESIUM SULFATE 500 MG/ML
1 VIAL (ML) INJECTION ONCE
Refills: 0 | Status: COMPLETED | OUTPATIENT
Start: 2023-10-25 | End: 2023-10-25

## 2023-10-25 RX ORDER — SENNA PLUS 8.6 MG/1
2 TABLET ORAL DAILY
Refills: 0 | Status: DISCONTINUED | OUTPATIENT
Start: 2023-10-25 | End: 2023-10-26

## 2023-10-25 RX ADMIN — Medication 1000 MILLIGRAM(S): at 18:30

## 2023-10-25 RX ADMIN — NICARDIPINE HYDROCHLORIDE 25 MG/HR: 30 CAPSULE, EXTENDED RELEASE ORAL at 17:24

## 2023-10-25 RX ADMIN — Medication 1 TABLET(S): at 19:05

## 2023-10-25 RX ADMIN — SODIUM CHLORIDE 90 MILLILITER(S): 9 INJECTION, SOLUTION INTRAVENOUS at 17:25

## 2023-10-25 RX ADMIN — GABAPENTIN 100 MILLIGRAM(S): 400 CAPSULE ORAL at 21:30

## 2023-10-25 RX ADMIN — Medication 15 MILLIGRAM(S): at 16:35

## 2023-10-25 RX ADMIN — MORPHINE SULFATE 4 MILLIGRAM(S): 50 CAPSULE, EXTENDED RELEASE ORAL at 16:30

## 2023-10-25 RX ADMIN — Medication 1000 MILLIGRAM(S): at 18:01

## 2023-10-25 RX ADMIN — Medication 81 MILLIGRAM(S): at 18:01

## 2023-10-25 RX ADMIN — Medication 15 MILLIGRAM(S): at 16:05

## 2023-10-25 RX ADMIN — Medication 100 GRAM(S): at 18:02

## 2023-10-25 RX ADMIN — Medication 5 MILLIGRAM(S): at 16:05

## 2023-10-25 RX ADMIN — MORPHINE SULFATE 4 MILLIGRAM(S): 50 CAPSULE, EXTENDED RELEASE ORAL at 17:00

## 2023-10-25 RX ADMIN — ATORVASTATIN CALCIUM 40 MILLIGRAM(S): 80 TABLET, FILM COATED ORAL at 21:30

## 2023-10-25 NOTE — ASU PREOP CHECKLIST - AS TEMP SITE
Progress Notes by Yeni Abbott DO at 09/26/18 03:35 PM     Author:  Yeni Abbott DO Service:  (none) Author Type:  Physician     Filed:  09/26/18 03:50 PM Encounter Date:  9/26/2018 Status:  Signed     :  Yeni Abbott DO (Physician)            Good FM. Feeling some contractions, no lof/vb. influenza vaccine today.[KS1.1M] mood stable. TSH normal. GBBS negative. s/sx of labor and FM reviewed.[KS1.2M]       Revision History        User Key Date/Time User Provider Type Action    > KS1.2 09/26/18 03:50 PM Yeni Abbott DO Physician Sign     KS1.1 09/26/18 03:35 PM Yeni Abbott DO Physician     M - Manual            
oral

## 2023-10-25 NOTE — BRIEF OPERATIVE NOTE - NSICDXBRIEFPROCEDURE_GEN_ALL_CORE_FT
PROCEDURES:  Endarterectomy of right carotid artery with patch graft 25-Oct-2023 15:28:54  Adrian Lindo

## 2023-10-25 NOTE — CONSULT NOTE ADULT - ATTENDING COMMENTS
Critical Care: [id number]   This patient has a high probability of sudden, clinically significant deterioration, which requires the highest level of physician preparedness to intervene urgently. I managed/supervised life or organ supporting interventions that required frequent physician assessment. I devoted my full attention in the ICU to the direct care of this patient for the period of time indicated below. Time I spent with the family or surrogate(s) is included only if the patient was incapable of providing the necessary information or participating in medical decision making. Time devoted to teaching and to any procedures I billed separately is not included.     RALPH BENAVIDEZ 78y Female admitted to [x ] SICU /[ ] SDU with post-OP high risk for neurological and vascular decline, S/P CEA, admitted to SICU with high risk for hemodynamic instability, airway at risk for obstruction, electrolytes abnormalities, HTN requiring Cardene   Patient is examined and evaluated at the bedside with SICU team. Treatment plan discussed with SICU team, nurses and primary team.   Chest X-ray and all relevant studies reviewed during rounds.  Will continue hemodynamic monitoring as per protocol in SICU.    Neuro:  GCS [15 ]  AAOx3 [ x]  Neurovascular checks as per SICU protocol, right tongue deviation post OP                 [ ] 3% NaCl     [ ] 2% NaCl                [ ] Keppra  [ ] Lamictal  [ ] Depakote  [ ] Dilantin                Pharmacological Paralysis [ ] Yes  [x ]  No      Sedated/Pain control with                 [ ] Dilaudid drip, [ ]  Ketamine drip, [ ] Fentanyl drip, [ ] Propofol, [ ] Precedex, [ ] Versed drip, [ ] Ativan drip,                           [ ] OxyContin standing,  [ ] OxyContin PRN, [ ] Dilaudid PRN pushes, [ ] Fentanyl PRN pushes, [ ] PCA,                [ x] Tylenol IV/PO, [ x] Gabapentin, [ ]  Ketorolac, [ ] Tramadol,  [ ] Lidoderm Patch       Other Medications               [ ] Seroquel, [ ] Zyprexa, [ ] Haldol,  [ ] Clonazepam [ ] Xanax, [ ] Versed/Ativan PRN, [ ] Valium [x ] None               [ ] Robaxin   [ ] Baclofen  [ ] Flexeril               [ ] CIWA (Ativan/Valium/ Librium)  CV: continue to monitor  On pressors [ ]  Yes  [x ]  No          [ ]  Levophed, [ ] Hans-Synephrine, [ ] Vasopressin, [ ]  Epinephrine          [ ] Dobutamine, [ ] Milrinone, [ ]  Midodrine,  [ ] Others    Other Cardiac Meds          [ ] Amiodarone IV/PO, [ ] Digoxin, [ ] Cardizem drip, [ ] Cleviprex drip, [ ] Esmolol drip, [x ] Cardene drip    Respiratory: Acute respiratory insufficiency -> continue monitoring, CXR clear bilateral                        None Invasive Support  [x ] Incentive Spirometer                                  [ ] BiPAP   [ ] CPAP/NIV   [ ] HFNC   [ ] NR Face Mask  [x ] NC  [ ] Trach Caller [ ] Room Air                        Ventilatory support  [ ] Yes [x ] No                            [ ] SBT                                  [ ] PC    [ ] VC   [ ] AC/PRVC   [ ] BiVent/APRV   [ ]CPAP   GI  [x ]  bowel regiment on hold [x ] BM none  [ x] Flatus none             [ ] Ostomy   [ ] NG tube        Prophylaxis [ ] PPI  [x ] H2 Blockers  [ ] Others  Nutrition: continue   [x ] Diet NPO  [ ] TPN/PPN   [ ] calories count   [ ]  Tube Feeds     Renal: Continue I&Os monitoring, Barnett catheter  [ x] Yes  [ ]  No  [x ] Consideration for discontinuation [ ] Taxes cath/PrimaFit  [ ] TOV                                                               [ ] HD/CVVH   [ ] Urine output       Lytes/Acid-base: replete hypokalemia, hypomagnesemia, hypocalcemia, hypophosphatemia        IV Fluids   [x ] LR@100ml/hr  [ ] NS  [ ] D5W1/2NS [ ] Bicarbonate Drip  [ ] Albumin [ ] Lasix drip/push  [ ] Bumex drip/push  ID: leukocytosis -> continue to monitor:         IV Abx [x ] Yes emelyn-OP, [ ] No;  ID consulted [ ] Yes, [x ] No        Cultures send  [ ] Respiratory   [ ] Blood   [ ] Urine  [ ] Fluids  [ ] Tissue  [ x]  None  Lines:   [ ] Right   [ ] Left  [ ] Bilateral                     [ ] Subclavian TLC        [ ]  Internal Jugular TLC     [ ]  Femoral TLC                     [ ] Subclavian Cordis    [ ] Internal Jugular Cordis   [ ] Femoral Cordis                     [ ] HD catheter    [ ] PICC     [ ]  Midline   x[ ] Peripheral IVs                                                 [ x] Right   [ ] Left   [ ] None                     [ x] Radial A-Line    [ ] Femoral A-Line   [ ] Axillary A-Line               Heme: continue to evaluate for acute blood loss anemia- trend Hg/Hct                     AC Reversal Indicated [ ] Yes  [x ] No                                      [ ] Kcentra,  [ ] 3Factors concentrate, [ ] Andexxa                     Transfused  indicated  [ ] Yes, [x ] No    [ ] PRBCs   [ ] Platelets   [ ] FFPs   [ ] Cryoprecipitate                    Should be started on or continued with  following  [ ] Yes,  [ x] No                               [ ] Lovenox  [ ] Coumadin  [ ] Heparin drip  [ ] DOVACs  [ ] ASA  [ ] Antiplatelets   Endocrine: Prevent and treat hyperglycemia with insulin as needed,                                 Insuline drip for hyperglycemia [ ] Yes, [ x] No   PV: follow pulse exam  Skin: decub precautions  DVT Prophylaxis:  [ x] SCDs  [ ] Heparin SQ  [ ] Lovenox  SQ  Stress Gastritis Prophylaxis: PPI/H2 Blockers if indicated  Mobility: patient is evaluated at the bedside with mobility team and the goals for today are discussed with PT [x ] out of bed to chair    PATIENT/FAMILY/SURROGATE CONFERENCE:  [x ] Yes with patient at the bedside. [ ] No  PURPOSE: To obtain necessary information, To discuss treatment options under consideration today.    I saw and evaluated the patient personally. I have reviewed and agree with note above. Treatment plan discussed with SICU team, nurses and primary team at the time of the multidisciplinary rounds. The above note is NOT written at the time of rounds and will reflect all changes throughout management of the patient for the day note is written for.    Anjelica Rizo MD, FACS  Trauma/ACS/SCC Attending

## 2023-10-25 NOTE — CONSULT NOTE ADULT - SUBJECTIVE AND OBJECTIVE BOX
SICU Consultation Note  =====================================================  HPI: 78y Female w/ PMH active smoker, HLD, diverticulosis, Right carotid stenosis followed by Dr Ervin found to have . Presents for scheduled Right carotid endarterectomy with patch repair. Patient tolerated the procedure well, recieved 6,000 IV heparin, 2g Ancef and total 200mg fentanyl. Patient awake alert and intact in PACU, SICU consult for q1 neuro checks.     Surgery Information  OR time: 2 hrs        EBL: 20cc          IV Fluids: 1500ml       Blood Products: none       UOP:  50 cc      PAST MEDICAL & SURGICAL HISTORY:  Diverticulosis  H/O carotid stenosis  History of cholecystectomy    Home Meds: Home Medications:  aspirin 81 mg oral tablet: 1 tab(s) orally once a day (18 Oct 2023 07:58)ca  rosuvastatin 10 mg oral capsule: 1 cap(s) orally once a day (18 Oct 2023 07:58)    Allergies: Allergies  No Known Allergies  Intolerances    Soc:   Advanced Directives: Presumed Full Code     ROS:    REVIEW OF SYSTEMS    [ x] A ten-point review of systems was otherwise negative except as noted.  [ ] Due to altered mental status/intubation, subjective information were not able to be obtained from the patient. History was obtained, to the extent possible, from review of the chart and collateral sources of information.      CURRENT MEDICATIONS:   --------------------------------------------------------------------------------------  Neurologic Medications  acetaminophen     Tablet .. 1000 milliGRAM(s) Oral every 6 hours  gabapentin 100 milliGRAM(s) Oral three times a day  ketorolac   Injectable 15 milliGRAM(s) IV Push every 8 hours PRN Moderate Pain (4 - 6)  ketorolac   Injectable 15 milliGRAM(s) IV Push once  morphine  - Injectable 4 milliGRAM(s) IV Push every 10 minutes PRN Severe Pain (7 - 10)  ondansetron Injectable 4 milliGRAM(s) IV Push every 6 hours PRN Nausea  oxyCODONE    IR 5 milliGRAM(s) Oral every 6 hours PRN Severe Pain (7 - 10)    Respiratory Medications    Cardiovascular Medications  hydrALAZINE Injectable 5 milliGRAM(s) IV Push once  niCARdipine Infusion 5 mG/Hr IV Continuous <Continuous>    Gastrointestinal Medications  lactated ringers. 1000 milliLiter(s) IV Continuous <Continuous>  senna 2 Tablet(s) Oral daily PRN Constipation    Genitourinary Medications    Hematologic/Oncologic Medications  aspirin enteric coated 81 milliGRAM(s) Oral once    Antimicrobial/Immunologic Medications    Endocrine/Metabolic Medications  atorvastatin 40 milliGRAM(s) Oral at bedtime    Topical/Other Medications    --------------------------------------------------------------------------------------    VITAL SIGNS, INS/OUTS (last 24 hours):  --------------------------------------------------------------------------------------  ICU Vital Signs Last 24 Hrs  T(C): 36.6 (25 Oct 2023 10:23), Max: 36.6 (25 Oct 2023 10:23)  T(F): 97.8 (25 Oct 2023 10:23), Max: 97.8 (25 Oct 2023 10:23)  HR: 68 (25 Oct 2023 16:00) (62 - 68)  BP: 135/68 (25 Oct 2023 16:15) (110/53 - 165/77)  BP(mean): 94 (25 Oct 2023 16:15) (94 - 97)  ABP: 187/52 (25 Oct 2023 16:15) (163/61 - 187/52)  ABP(mean): 107 (25 Oct 2023 16:15) (103 - 111)  RR: 25 (25 Oct 2023 16:00) (18 - 25)  SpO2: 99% (25 Oct 2023 16:00) (99% - 99%)      I&O's Summary    --------------------------------------------------------------------------------------    EXAM:  General/Neuro  Exam: Normal, NAD, alert, oriented x 3, no focal deficits. PERRLA  L tongue deviation     Respiratory  Exam: Lungs clear to auscultation, decreased BS BL. Normal expansion/effort.      Cardiovascular  Exam: S1, S2.  Regular rate and rhythm.  Peripheral edema  ***  Cardiac Rhythm: Normal Sinus Rhythm  ECHO: 10/20/2022    Summary:   1. Normal global left ventricular systolic function.   2. LV Ejection Fraction by Sandhu's Method with a biplane EF of 71 %.   3. Normal left atrial size.   4. There is no evidence of pericardial effusion.   5. Mild mitral valve regurgitation.   6. Structurally normal mitral valve, with normal leaflet excursion.   7. Moderate tricuspid regurgitation.    PHYSICIAN INTERPRETATION:  Left Ventricle: The left ventricular internal cavity size is normal.   Global LV systolic function was normal. Normal segmental left ventricular   systolic function.  Left Atrium: Normal left atrial size.  Pericardium: There is no evidence of pericardial effusion.  Mitral Valve: Structurally normal mitral valve, with normal leaflet   excursion. Mild mitral valve regurgitation is seen.  Tricuspid Valve: Moderate tricuspid regurgitation is visualized.    GI  Exam: Abdomen soft, Non-tender, Non-distended.      Tubes/Lines/Drains  ***  [x] Peripheral IV  L radial a line         Date: 10/25/2023    Extremities  Exam: Extremities warm, pink, well-perfused.      Derm:  Exam: Good skin turgor, no skin breakdown.      :   Exam: Barnett catheter in place.     LABS  --------------------------------------------------------------------------------------  Labs:  CAPILLARY BLOOD GLUCOSE                        11.4   7.42  )-----------( x        ( 25 Oct 2023 16:16 )             34.8        --------------------------------------------------------------------------------------    OTHER LABS    IMAGING RESULTS

## 2023-10-25 NOTE — CONSULT NOTE ADULT - ASSESSMENT
Assessment & Plan     78y Female w/ PMH active smoker, HLD, diverticulosis, Right carotid stenosis followed by Dr Ervin found to have . Presents for scheduled Right carotid endarterectomy with patch repair. Patient tolerated the procedure well, recieved 6,000 IV heparin, 2g Ancef and total 200mg fentanyl. Patient awake alert and intact in PACU, SICU consult for q1 neuro checks.     NEURO:  s/p R CEA w/ patch   - noted L tongue deviation on exam, no other focal deficits - vascular team made aware   - q1 neurochecks     Acute pain-controlled with tylenol PO ATC, gabapentin 100mg q 8hr    RESP:   Hx active smoker, ?COPD (no PFTs)    - 60 pack year, b/l flatten diaphragm on CXR w/ concern for COPD    - would benefit from pulm consult outpatient    wean off NC to RA as tolerate, keep sats >92%  encourage IS and early ambulation   Activity- increase as tolerated       CARDS:   hx HLD    - restart atorvastatin 40mg qhs  Keep SBP     - received hydralazine 5mg IVP in PACU for     - start cardene gtt (goal SBP )    - trend CE x 3    - post op EKG NSR QTc wnl      GI/NUTR:   Diet, NPO    - after post op check if cleared by primary will advance to clears  GI Prophylaxis     - not needed  Bowel regimen     - miralax daily, senna 2 Tablet(s) Oral daily PRN      /RENAL:   - car placed for OR  - d/c car at 6pm   - TOV by midnight        HEME/ONC:   Antiplatelet therapy   - give ASA 81 mg at 6pm , restart daily in AM (home med)    DVT ppx   - on hold, per vasc team, received 6,000u heparin IV intra op       Labs: Hb/Hct:  11.4/34.8  (10-25 @ 16:16)  -->                      Plts:  161  -->                 PTT/INR:        ID:  WBC- 7.42  --->>  Temp trend- 24hrs T(F): 97.8 (10-25 @ 10:23), Max: 97.8 (10-25 @ 10:23)  Current antibiotics- s/p 2g Ancef intra op, f/u Dr Ervin to continue x 3 doses     ENDO:     no known history      Glucose monitoring q 6hr while NPO     LINES/DRAINS:  PIV, Matador, Car     DISPO:    SICU for q1 neuro checks Assessment & Plan  78y Female w/ PMH active smoker, HLD, diverticulosis, Right carotid stenosis followed by Dr Ervin found to have . Presents for scheduled Right carotid endarterectomy with patch repair. Patient tolerated the procedure well, recieved 6,000 IV heparin, 2g Ancef and total 200mg fentanyl. Patient awake alert and intact in PACU, SICU consult for q1 neuro checks.     NEURO:  s/p R CEA w/ patch   - noted L tongue deviation on exam, no other focal deficits - vascular team made aware   - q1 neurochecks     Acute post op pain   - controlled with tylenol PO ATC, gabapentin 100mg q 8hr, prn toradol & oxycodone    RESP:   Hx active smoker, ?COPD (no PFTs)    - 60 pack year, b/l flatten diaphragm on CXR w/ concern for COPD    - would benefit from pulm consult outpatient    wean off NC to RA as tolerate, keep sats >92%  encourage IS and early ambulation   Activity- increase as tolerated       CARDS:   hx HLD    - restart atorvastatin 40mg qhs  Keep SBP     - received hydralazine 5mg IVP in PACU for     - start cardene gtt (goal SBP )    - trend CE x 3    - post op EKG NSR QTc wnl    GI/NUTR:   Diet, NPO    - after post op check if cleared by primary will advance to clears  GI Prophylaxis     - not needed  Bowel regimen     - miralax daily, senna 2 Tablet(s) Oral daily PRN    /RENAL:   IVF @ 90cc/hr - d/c when tolerating diet   - car placed for OR  - d/c car at 6pm   - TOV by midnight        HEME/ONC:   Antiplatelet therapy   - give ASA 81 mg at 6pm , restart daily in AM (home med)    DVT ppx   - on hold, per vasc team, received 6,000u heparin IV intra op       Labs: Hb/Hct:  11.4/34.8  (10-25 @ 16:16)  -->                      Plts:  161         ID:  WBC- 7.42  --->>  Temp trend- 24hrs T(F): 97.8 (10-25 @ 10:23), Max: 97.8 (10-25 @ 10:23)  Current antibiotics- s/p 2g Ancef intra op, f/u Dr Ervin to continue x 3 doses     ENDO:     no known history      Glucose monitoring q 6hr while NPO     LINES/DRAINS:  Melody MOHAMUD Foley     DISPO:    SICU for q1 neuro checks

## 2023-10-25 NOTE — CHART NOTE - NSCHARTNOTEFT_GEN_A_CORE
Post Operative Note  Patient: RALPH BENAVIDEZ (1945) Female   MRN: [id number]  Location: [address]  Visit: 10-25-23 Inpatient  Date: 10-25-23 @ 20:26    Procedure: Carotid stenosis, right     S/P Endarterectomy of right carotid artery with graft    Endarterectomy of right carotid artery with patch graft        Subjective:   Nausea:  no, Vomiting: no, Ambulating: no, Flatus:  no  Pain Assessment: Patient is complaining of mild pain that is appropriate for post-operative course.     Objective:  Vitals: T(F): 98 (10-25-23 @ 18:00), Max: 98 (10-25-23 @ 18:00)  HR: 70 (10-25-23 @ 19:00)  BP: 111/56 (10-25-23 @ 19:00) (104/54 - 165/77)  RR: 20 (10-25-23 @ 19:00)  SpO2: 100% (10-25-23 @ 19:00)  Vent Settings:     In:   10-25-23 @ 07:01  -  10-25-23 @ 20:26  --------------------------------------------------------  IN: 547.5 mL      IV Fluids: lactated ringers. 1000 milliLiter(s) (90 mL/Hr) IV Continuous <Continuous>      Out:   10-25-23 @ 07:01  -  10-25-23 @ 20:26  --------------------------------------------------------  OUT: 275 mL      EBL:     Voided Urine:   10-25-23 @ 07:01  -  10-25-23 @ 20:26  --------------------------------------------------------  OUT: 275 mL      Barnett Catheter: yes no   Drains:   LATIA:    ,   Chest Tube:      NG Tube:       Physical Examination:  General Appearance: NAD,   HEENT: right neck dressing in place c/d/i, no swelling or hematoma   Heart: RRR  Lungs: CTABL  Abdomen:  Soft, non tender, appropriate for post-op course, nondistended.   Extremities: warm, well perfused   NERVOUS SYSTEM:  Alert & Oriented X3, speech clear. Answers questions appropriately. Facial movements symmetrical, no facial droop, tongue with slight right sided deviation. Full and equal 5/5 strength B/L upper and lower extremities. +reflexes B/L LE. Sensation intact. No motor drift. No deficits   MSK: FROM x 4 extremities           Medications: [Standing]  acetaminophen     Tablet .. 1000 milliGRAM(s) Oral every 6 hours  atorvastatin 40 milliGRAM(s) Oral at bedtime  gabapentin 100 milliGRAM(s) Oral three times a day  ketorolac   Injectable 15 milliGRAM(s) IV Push every 8 hours PRN  lactated ringers. 1000 milliLiter(s) IV Continuous <Continuous>  niCARdipine Infusion 5 mG/Hr IV Continuous <Continuous>  oxyCODONE    IR 5 milliGRAM(s) Oral every 6 hours PRN  senna 2 Tablet(s) Oral daily PRN    Medications: [PRN]  acetaminophen     Tablet .. 1000 milliGRAM(s) Oral every 6 hours  atorvastatin 40 milliGRAM(s) Oral at bedtime  gabapentin 100 milliGRAM(s) Oral three times a day  ketorolac   Injectable 15 milliGRAM(s) IV Push every 8 hours PRN  lactated ringers. 1000 milliLiter(s) IV Continuous <Continuous>  niCARdipine Infusion 5 mG/Hr IV Continuous <Continuous>  oxyCODONE    IR 5 milliGRAM(s) Oral every 6 hours PRN  senna 2 Tablet(s) Oral daily PRN      DVT PROPHYLAXIS:   GI PROPHYLAXIS:   ANTICOAGULATION:   ANTIBIOTICS:        Labs:                        11.4   7.42  )-----------( 161      ( 25 Oct 2023 16:16 )             34.8     10-25    139  |  103  |  14  ----------------------------<  144<H>  4.2   |  23  |  0.7    Ca    8.4      25 Oct 2023 16:16  Phos  3.7     10-25  Mg     1.9     10-25        CARDIAC MARKERS ( 25 Oct 2023 17:07 )  x     / <0.01 ng/mL / 106 U/L / x     / 5.9 ng/mL        Imaging:  No post-op imaging studies    Assessment:  78yF s/p right common endarterectomy     Plan:  - SICU for q1 hour neurological checks   - clear liquid diet, IVF   - Monitor vitals  - Monitor post-op labs and replete as necessary  - Monitor for bowel function  - Continue Pain Medications if necessary  - Monitor urine output   - Monitor wound and dressing for changes, redress as needed.      Date/Time: 10-25-23 @ 20:26
Type 2 diabetes mellitus without complication, without long-term current use of insulin

## 2023-10-26 ENCOUNTER — TRANSCRIPTION ENCOUNTER (OUTPATIENT)
Age: 78
End: 2023-10-26

## 2023-10-26 VITALS
TEMPERATURE: 97 F | HEART RATE: 72 BPM | SYSTOLIC BLOOD PRESSURE: 122 MMHG | RESPIRATION RATE: 16 BRPM | DIASTOLIC BLOOD PRESSURE: 59 MMHG | OXYGEN SATURATION: 97 %

## 2023-10-26 PROCEDURE — 99233 SBSQ HOSP IP/OBS HIGH 50: CPT

## 2023-10-26 RX ORDER — CHLORHEXIDINE GLUCONATE 213 G/1000ML
1 SOLUTION TOPICAL
Refills: 0 | Status: DISCONTINUED | OUTPATIENT
Start: 2023-10-26 | End: 2023-10-26

## 2023-10-26 RX ADMIN — GABAPENTIN 100 MILLIGRAM(S): 400 CAPSULE ORAL at 05:08

## 2023-10-26 RX ADMIN — Medication 1000 MILLIGRAM(S): at 12:11

## 2023-10-26 RX ADMIN — Medication 81 MILLIGRAM(S): at 12:08

## 2023-10-26 RX ADMIN — POLYETHYLENE GLYCOL 3350 17 GRAM(S): 17 POWDER, FOR SOLUTION ORAL at 12:09

## 2023-10-26 RX ADMIN — Medication 1000 MILLIGRAM(S): at 05:08

## 2023-10-26 NOTE — DISCHARGE NOTE NURSING/CASE MANAGEMENT/SOCIAL WORK - PATIENT PORTAL LINK FT
You can access the FollowMyHealth Patient Portal offered by [facility] by registering at the following website: [email or web address]. By joining [employer]’s FollowMyHealth portal, you will also be able to view your health information using other applications (apps) compatible with our system.

## 2023-10-26 NOTE — DISCHARGE NOTE PROVIDER - ATTENDING ATTESTATION STATEMENT
Patient I have personally seen and examined the patient. I have collaborated with and supervised the

## 2023-10-26 NOTE — DISCHARGE NOTE NURSING/CASE MANAGEMENT/SOCIAL WORK - NSDCPEFALRISK_GEN_ALL_CORE
For information on Fall & Injury Prevention, visit: [email or web address] OR  [email or web address] OR  https://www.cdc.gov/steadi/patient.html

## 2023-10-26 NOTE — PROGRESS NOTE ADULT - ATTENDING COMMENTS
Critical Care: [id number]   This patient has a high probability of sudden, clinically significant deterioration, which requires the highest level of physician preparedness to intervene urgently. I managed/supervised life or organ supporting interventions that required frequent physician assessment. I devoted my full attention in the ICU to the direct care of this patient for the period of time indicated below. Time I spent with the family or surrogate(s) is included only if the patient was incapable of providing the necessary information or participating in medical decision making. Time devoted to teaching and to any procedures I billed separately is not included.     RALPH BENAVIDEZ 78y Female admitted to [x ] SICU /[ ] SDU with post-OP high risk for neurological and vascular decline, S/P CEA, admitted to SICU with high risk for hemodynamic instability, airway at risk for obstruction, electrolytes abnormalities, HTN requiring Cardene   Patient is examined and evaluated at the bedside with SICU team. Treatment plan discussed with SICU team, nurses and primary team.   Chest X-ray and all relevant studies reviewed during rounds.  Will continue hemodynamic monitoring as per protocol in SICU.    Neuro:  GCS [15 ]  AAOx3 [ x]  Neurovascular checks as per SICU protocol, right tongue deviation post OP                 [ ] 3% NaCl     [ ] 2% NaCl                [ ] Keppra  [ ] Lamictal  [ ] Depakote  [ ] Dilantin                Pharmacological Paralysis [ ] Yes  [x ]  No      Sedated/Pain control with                 [ ] Dilaudid drip, [ ]  Ketamine drip, [ ] Fentanyl drip, [ ] Propofol, [ ] Precedex, [ ] Versed drip, [ ] Ativan drip,                           [ ] OxyContin standing,  [ ] OxyContin PRN, [ ] Dilaudid PRN pushes, [ ] Fentanyl PRN pushes, [ ] PCA,                [ x] Tylenol IV/PO, [ x] Gabapentin, [ ]  Ketorolac, [ ] Tramadol,  [ ] Lidoderm Patch       Other Medications               [ ] Seroquel, [ ] Zyprexa, [ ] Haldol,  [ ] Clonazepam [ ] Xanax, [ ] Versed/Ativan PRN, [ ] Valium [x ] None               [ ] Robaxin   [ ] Baclofen  [ ] Flexeril               [ ] CIWA (Ativan/Valium/ Librium)  CV: continue to monitor  On pressors [ ]  Yes  [x ]  No          [ ]  Levophed, [ ] Hans-Synephrine, [ ] Vasopressin, [ ]  Epinephrine          [ ] Dobutamine, [ ] Milrinone, [ ]  Midodrine,  [ ] Others    Other Cardiac Meds          [ ] Amiodarone IV/PO, [ ] Digoxin, [ ] Cardizem drip, [ ] Cleviprex drip, [ ] Esmolol drip, [x ] Cardene drip -> D/C    Respiratory: Acute respiratory insufficiency -> continue monitoring, CXR clear bilateral                        None Invasive Support  [x ] Incentive Spirometer                                  [ ] BiPAP   [ ] CPAP/NIV   [ ] HFNC   [ ] NR Face Mask  [ ] NC  [ ] Trach Caller [x ] Room Air                        Ventilatory support  [ ] Yes [x ] No                            [ ] SBT                                  [ ] PC    [ ] VC   [ ] AC/PRVC   [ ] BiVent/APRV   [ ]CPAP   GI  [x ]  bowel regiment on hold [x ] BM 10/25/23  [ x] Flatus none             [ ] Ostomy   [ ] NG tube        Prophylaxis [ ] PPI  [x ] H2 Blockers  [ ] Others  Nutrition: continue   [x ] Diet DASH  [ ] TPN/PPN   [ ] calories count   [ ]  Tube Feeds     Renal: Continue I&Os monitoring, Barnett catheter  [ ] Yes  [x ]  No  [ ] Consideration for discontinuation [ ] Taxes cath/PrimaFit  [ ] TOV                                                               [ ] HD/CVVH   [ ] Urine output       Lytes/Acid-base: replete hypokalemia, hypomagnesemia, hypocalcemia, hypophosphatemia        IV Fluids   [x ] LR@10ml/hr  [ ] NS  [ ] D5W1/2NS [ ] Bicarbonate Drip  [ ] Albumin [ ] Lasix drip/push  [ ] Bumex drip/push  ID: leukocytosis -> continue to monitor:         IV Abx [x ] Yes emelyn-OP, [ ] No;  ID consulted [ ] Yes, [x ] No        Cultures send  [ ] Respiratory   [ ] Blood   [ ] Urine  [ ] Fluids  [ ] Tissue  [ x]  None  Lines:   [ ] Right   [ ] Left  [ ] Bilateral                     [ ] Subclavian TLC        [ ]  Internal Jugular TLC     [ ]  Femoral TLC                     [ ] Subclavian Cordis    [ ] Internal Jugular Cordis   [ ] Femoral Cordis                     [ ] HD catheter    [ ] PICC     [ ]  Midline   [x ] Peripheral IVs                                                 [ x] Right   [ ] Left   [ ] None                     [ x] Radial A-Line  to be D/C  [ ] Femoral A-Line   [ ] Axillary A-Line               Heme: continue to evaluate for acute blood loss anemia- trend Hg/Hct                     AC Reversal Indicated [ ] Yes  [x ] No                                      [ ] Kcentra,  [ ] 3Factors concentrate, [ ] Andexxa                     Transfused  indicated  [ ] Yes, [x ] No    [ ] PRBCs   [ ] Platelets   [ ] FFPs   [ ] Cryoprecipitate                    Should be started on or continued with  following  [ ] Yes,  [ x] No                               [ ] Lovenox  [ ] Coumadin  [ ] Heparin drip  [ ] DOVACs  [ ] ASA  [ ] Antiplatelets   Endocrine: Prevent and treat hyperglycemia with insulin as needed,                                 Insuline drip for hyperglycemia [ ] Yes, [ x] No   PV: follow pulse exam  Skin: decub precautions  DVT Prophylaxis:  [ x] SCDs  [ ] Heparin SQ  [ ] Lovenox  SQ  Stress Gastritis Prophylaxis: PPI/H2 Blockers if indicated  Mobility: patient is evaluated at the bedside with mobility team and the goals for today are discussed with PT [x ] out of bed to chair and ambulate    PATIENT/FAMILY/SURROGATE CONFERENCE:  [x ] Yes with patient at the bedside. [ ] No  PURPOSE: To obtain necessary information, To discuss treatment options under consideration today.    I saw and evaluated the patient personally. I have reviewed and agree with note above. Treatment plan discussed with SICU team, nurses and primary team at the time of the multidisciplinary rounds. The above note is NOT written at the time of rounds and will reflect all changes throughout management of the patient for the day note is written for.    Anjelica Rizo MD, FACS  Trauma/ACS/SCC Attending

## 2023-10-26 NOTE — DISCHARGE NOTE PROVIDER - HOSPITAL COURSE
A 78 year old female was admitted and underwent a right carotid endarterectomy with patch graft on 10/25/2023 for symptomatic carotid artery stenosis. Postoperatively, she was admitted to the surgical ICU for further hemodynamic monitoring. Her systolic blood pressure remained between  mm Hg, her diet was progressed which she tolerated, car was removed and she passed her trial of void. She remained alert and A 78 year old female was admitted and underwent a right carotid endarterectomy with patch graft on 10/25/2023 for symptomatic carotid artery stenosis. Postoperatively, she was admitted to the surgical ICU for further hemodynamic monitoring. Her systolic blood pressure remained between  mm Hg, her diet was progressed which she tolerated, Barnett was removed and she passed her trial of void. She remained alert and oriented, had no neurological deficits, and remained hemodynamically stable. Therefore, it is medically appropriate to discharge her.

## 2023-10-26 NOTE — DISCHARGE NOTE PROVIDER - NSDCMRMEDTOKEN_GEN_ALL_CORE_FT
aspirin 81 mg oral tablet: 1 tab(s) orally once a day  rosuvastatin 10 mg oral capsule: 1 cap(s) orally once a day

## 2023-10-26 NOTE — PROGRESS NOTE ADULT - ASSESSMENT
Assessment & Plan  78y Female w/ PMH active smoker, HLD, diverticulosis, Right carotid stenosis followed by Dr Ervin found to have . Presents for scheduled Right carotid endarterectomy with patch repair. Patient tolerated the procedure well, recieved 6,000 IV heparin, 2g Ancef and total 200mg fentanyl. Patient awake alert and intact in PACU, SICU consult for q1 neuro checks.     NEURO:  s/p R CEA w/ patch   - noted L tongue deviation on exam, no other focal deficits - vascular team made aware   - q1 neurochecks     Acute post op pain   - controlled with tylenol PO ATC, gabapentin 100mg q 8hr, prn toradol & oxycodone    RESP:   Hx active smoker, ?COPD (no PFTs)    - 60 pack year, b/l flatten diaphragm on CXR w/ concern for COPD    - would benefit from pulm consult outpatient    wean off NC to RA as tolerate, keep sats >92%  encourage IS and early ambulation   Activity- increase as tolerated       CARDS:   hx HLD    - restart atorvastatin 40mg qhs  Keep SBP     - received hydralazine 5mg IVP in PACU for     - weaned off nicardipine infusion    - trend CE x 3    - post op EKG NSR QTc wnl    GI/NUTR:   Diet, NPO    - after post op check if cleared by primary will advance to clears  GI Prophylaxis     - not needed  Bowel regimen     - miralax daily, senna 2 Tablet(s) Oral daily PRN    /RENAL:   IV locked  - car placed for OR  - car removed at 6pm, TOV pending    Labs:          BUN/Cr- 14/0.7  -->,  13/0.6  -->          Electrolytes-Na 139 // K 4.0 // Mg 2.2 //  Phos 3.7 (10-25 @ 20:22)      HEME/ONC:   Antiplatelet therapy   - give ASA 81 mg at 6pm , restart daily in AM (home med)    DVT ppx   - on hold, per vasc team, received 6,000u heparin IV intra op     Labs: Hb/Hct:  11.4/34.8  -->,  11.2/34.2  -->                      Plts:  161  -->,  187  -->                 PTT/INR:      ID:  WBC- 7.42  --->>,  10.77  --->>  Temp trend- 24hrs T(F): 96.7 (10-25 @ 20:00), Max: 98 (10-25 @ 18:00)  Current antibiotics- s/p 2g Ancef intra op, f/u Dr Ervin to continue x 3 doses     ENDO:     no known history      Glucose monitoring q 6hr while NPO     LINES/DRAINS:  Melody MOHAMUD    DISPO:    SICU for q1 neuro checks   Assessment & Plan  78y Female w/ PMH active smoker, HLD, diverticulosis, Right carotid stenosis followed by Dr Ervin found to have . Presents for scheduled Right carotid endarterectomy with patch repair. Patient tolerated the procedure well, recieved 6,000 IV heparin, 2g Ancef and total 200mg fentanyl. Patient awake alert and intact in PACU, SICU consult for q1 neuro checks.     NEURO:  s/p R CEA w/ patch   - noted L tongue deviation on exam, no other focal deficits - vascular team made aware   - q1 neurochecks     Acute post op pain   - controlled with tylenol PO ATC, gabapentin 100mg q 8hr,     RESP:   Hx active smoker, ?COPD (no PFTs)    - 60 pack year, b/l flatten diaphragm on CXR w/ concern for COPD    - would benefit from pulm consult outpatient    weaned to RA, keep sats >92%  encourage IS and early ambulation   Activity- increase as tolerated       CARDS:   hx HLD    - restart atorvastatin 40mg qhs  Keep SBP     - received hydralazine 5mg IVP in PACU for     - weaned off nicardipine infusion     - trop neg x1    - post op EKG NSR QTc wnl    GI/NUTR:   Diet, dash  GI Prophylaxis     - not needed  Bowel regimen     - miralax daily, senna 2 Tablet(s) Oral daily PRN    /RENAL:   IV locked  - car placed for OR  - car removed at 6pm, voiding    Labs:          BUN/Cr- 14/0.7  -->,  13/0.6  -->          Electrolytes-Na 139 // K 4.0 // Mg 2.2 //  Phos 3.7 (10-25 @ 20:22)      HEME/ONC:   Antiplatelet therapy   - give ASA 81 mg at 6pm , restart daily in AM (home med)    DVT ppx   - on hold, per vasc team, received 6,000u heparin IV intra op     Labs: Hb/Hct:  11.4/34.8  -->,  11.2/34.2  -->                      Plts:  161  -->,  187  -->                 PTT/INR:      ID:  WBC- 7.42  --->>,  10.77  --->>  Temp trend- 24hrs T(F): 96.7 (10-25 @ 20:00), Max: 98 (10-25 @ 18:00)  Current antibiotics- s/p 2g Ancef intra op, f/u Dr Ervin to continue x 3 doses     ENDO:     no known history      Glucose monitoring q 6hr while NPO     LINES/DRAINS:  Melody MOHAMUD    DISPO:    SICU for q1 neuro checks

## 2023-10-26 NOTE — DISCHARGE NOTE PROVIDER - NSDCCPCAREPLAN_GEN_ALL_CORE_FT
PRINCIPAL DISCHARGE DIAGNOSIS  Diagnosis: Symptomatic carotid artery stenosis  Assessment and Plan of Treatment:

## 2023-10-26 NOTE — PROGRESS NOTE ADULT - ASSESSMENT
78yF s/p right CEA    Plan:  - SICU for q1 hour neurological checks   - clear liquid diet, IVF   - Monitor vitals  - Monitor post-op labs and replete as necessary  - Monitor for bowel function  - Continue Pain Medications if necessary  - Monitor urine output   - Monitor wound and dressing for changes, redress as needed.  - discharge planning     spectra [phone]

## 2023-10-26 NOTE — DISCHARGE NOTE PROVIDER - CARE PROVIDER_API CALL
Waylon Ervin  Vascular Surgery  [address] [place] [address]  Phone: [phone]  Fax: [phone]  Follow Up Time:

## 2023-10-26 NOTE — DISCHARGE NOTE PROVIDER - NSDCCPTREATMENT_GEN_ALL_CORE_FT
PRINCIPAL PROCEDURE  Procedure: Endarterectomy of right carotid artery with patch graft  Findings and Treatment: Diet:    - Continue regular diet as tolerated.   Activity:    - You may ambulate and otherwise resume normal daily activities as tolerated.   Incisions:    - You may remove your dressings in 48 hours.    - You may shower and allow soap and water to rinse over incisions.    - Please avoid scrubbing the areas and do not submerge your incisions in water for at least 2 weeks.  Medications:    - You may resume your home medications.    - You may take Tylenol 650mg every 6 hours and alternate with Ibuprofen 600mg every 8 hours for pain. You may find these medications over the counter at your local pharmacy.  Follow-up:    - Please call the office to schedule a follow-up appointment with Dr. Ervin within 1-2 weeks, or follow-up as previously scheduled.  Please call the office or return to the ED with persistent fever greater than 100.4F, chest pain, shortness of breath, mental status changes, or inability to tolerate oral intake.

## 2023-10-26 NOTE — PROGRESS NOTE ADULT - SUBJECTIVE AND OBJECTIVE BOX
MINE AUGUSTINR  [id number]  78y Female    Indication for ICU admission: s/p R CEA w/ patch, q1 neurochecks   Admit Date:10-25-23  ICU Date: 10/25/23  OR Date: 10/25/23    No Known Allergies    PAST MEDICAL & SURGICAL HISTORY:  Diverticulosis    H/O carotid stenosis    History of cholecystectomy      Home Medications:  aspirin 81 mg oral tablet: 1 tab(s) orally once a day (18 Oct 2023 07:58)  rosuvastatin 10 mg oral capsule: 1 cap(s) orally once a day (18 Oct 2023 07:58)        24HRS EVENT:  10/25  NIGHT  -PM rounds: cardene gtt off, normotensive (goal SBP ), 1L NC  -TOV 12AM  -DASH for AM   - a/o x3, no focal deficits, strength 5/5 bilaterally in upper and lower extremities    DAY  -received hydralazine 5 IVP   -SBP 180s in PACU --> started nicardipine gtt  -d/c car at 6pm  -aspirin dose at 6pm, start AM tmrw   -1G MAG        DVT PTX: 6,000 u heparin intraop    GI PTX: not indicated    ***Tubes/Lines/Drains  ***  Peripheral IV  Arterial Line		L RADIAL                 Date 10/25/23    REVIEW OF SYSTEMS    [X ] A ten-point review of systems was otherwise negative except as noted.  [ ] Due to altered mental status/intubation, subjective information were not able to be obtained from the patient. History was obtained, to the extent possible, from review of the chart and collateral sources of information.   MINE BENAVIDEZ  [id number]  78y Female    Indication for ICU admission: s/p R CEA w/ patch, q1 neurochecks   Admit Date:10-25-23  ICU Date: 10/25/23  OR Date: 10/25/23    No Known Allergies    PAST MEDICAL & SURGICAL HISTORY:  Diverticulosis    H/O carotid stenosis    History of cholecystectomy      Home Medications:  aspirin 81 mg oral tablet: 1 tab(s) orally once a day (18 Oct 2023 07:58)  rosuvastatin 10 mg oral capsule: 1 cap(s) orally once a day (18 Oct 2023 07:58)        24HRS EVENT:  10/25  NIGHT  -PM rounds: cardene gtt off, normotensive (goal SBP ), 1L NC  -TOV 12AM  -DASH for AM   - a/o x3, no focal deficits, strength 5/5 bilaterally in upper and lower extremities    DAY  -received hydralazine 5 IVP   -SBP 180s in PACU --> started nicardipine gtt  -d/c car at 6pm  -aspirin dose at 6pm, start AM tmrw   -1G MAG      REVIEW OF SYSTEMS    [X ] A ten-point review of systems was otherwise negative except as noted.  [ ] Due to altered mental status/intubation, subjective information were not able to be obtained from the patient. History was obtained, to the extent possible, from review of the chart and collateral sources of information.  Daily Height in cm: 167.64 (25 Oct 2023 10:23)    Daily     Diet, DASH/TLC:   Sodium & Cholesterol Restricted (10-25-23 @ 21:26)      CURRENT MEDS:  Neurologic Medications  acetaminophen     Tablet .. 1000 milliGRAM(s) Oral every 6 hours  gabapentin 100 milliGRAM(s) Oral three times a day    Respiratory Medications    Cardiovascular Medications    Gastrointestinal Medications  lactated ringers. 1000 milliLiter(s) IV Continuous <Continuous>  polyethylene glycol 3350 17 Gram(s) Oral daily  senna 2 Tablet(s) Oral daily PRN Constipation    Genitourinary Medications    Hematologic/Oncologic Medications  aspirin enteric coated 81 milliGRAM(s) Oral daily    Antimicrobial/Immunologic Medications    Endocrine/Metabolic Medications  atorvastatin 40 milliGRAM(s) Oral at bedtime    Topical/Other Medications      ICU Vital Signs Last 24 Hrs  T(C): 36.3 (26 Oct 2023 04:00), Max: 36.7 (25 Oct 2023 18:00)  T(F): 97.3 (26 Oct 2023 04:00), Max: 98 (25 Oct 2023 18:00)  HR: 64 (26 Oct 2023 06:00) (60 - 96)  BP: 127/58 (26 Oct 2023 06:00) (104/54 - 165/77)  BP(mean): 84 (26 Oct 2023 06:00) (76 - 99)  ABP: 120/39 (26 Oct 2023 06:00) (117/35 - 187/52)  ABP(mean): 69 (26 Oct 2023 06:00) (61 - 111)  RR: 20 (25 Oct 2023 19:00) (16 - 25)  SpO2: 100% (26 Oct 2023 06:00) (91% - 100%)    O2 Parameters below as of 26 Oct 2023 04:00  Patient On (Oxygen Delivery Method): room air      I&O's Summary    25 Oct 2023 07:01  -  26 Oct 2023 07:00  --------------------------------------------------------  IN: 1627.5 mL / OUT: 825 mL / NET: 802.5 mL      I&O's Detail    25 Oct 2023 07:01  -  26 Oct 2023 07:00  --------------------------------------------------------  IN:    Lactated Ringers: 1440 mL    NiCARdipine: 37.5 mL    Oral Fluid: 150 mL  Total IN: 1627.5 mL    OUT:    Indwelling Catheter - Urethral (mL): 275 mL    Voided (mL): 550 mL  Total OUT: 825 mL    Total NET: 802.5 mL      PHYSICAL EXAM:    General/Neuro   Deficits:                             alert & oriented x 3, mild right side tongue deviation,   Pupils:  -R neck dressing in c/d/i , no ecchymosis, hematoma, no tenderness to palpation    Lungs:      clear to auscultation, Normal expansion/effort.     Cardiovascular : S1, S2.  Regular rate and rhythm.  No Peripheral edema   Cardiac Rhythm: Normal Sinus Rhythm    GI: Abdomen soft, Non-tender, Non-distended.      Extremities: Extremities warm, pink, well-perfused. Pulses: palpable dp b/l    Derm: Good skin turgor, no skin breakdown.      :       voiding      CXR:     LABS:  CAPILLARY BLOOD GLUCOSE      POCT Blood Glucose.: 141 mg/dL (25 Oct 2023 18:07)                          11.2   10.77 )-----------( 187      ( 25 Oct 2023 20:22 )             34.2       10-25    139  |  102  |  13  ----------------------------<  199<H>  4.0   |  26  |  0.6<L>    Ca    8.5      25 Oct 2023 20:22  Phos  3.7     10-25  Mg     2.2     10-25          CARDIAC MARKERS ( 25 Oct 2023 17:07 )  x     / <0.01 ng/mL / 106 U/L / x     / 5.9 ng/mL      Urinalysis Basic - ( 25 Oct 2023 20:22 )    Color: x / Appearance: x / SG: x / pH: x  Gluc: 199 mg/dL / Ketone: x  / Bili: x / Urobili: x   Blood: x / Protein: x / Nitrite: x   Leuk Esterase: x / RBC: x / WBC x   Sq Epi: x / Non Sq Epi: x / Bacteria: x

## 2023-10-29 LAB
SURGICAL PATHOLOGY STUDY: SIGNIFICANT CHANGE UP
SURGICAL PATHOLOGY STUDY: SIGNIFICANT CHANGE UP

## 2023-11-02 DIAGNOSIS — I65.21 OCCLUSION AND STENOSIS OF RIGHT CAROTID ARTERY: ICD-10-CM

## 2023-11-02 DIAGNOSIS — F17.210 NICOTINE DEPENDENCE, CIGARETTES, UNCOMPLICATED: ICD-10-CM

## 2023-11-02 DIAGNOSIS — E78.5 HYPERLIPIDEMIA, UNSPECIFIED: ICD-10-CM

## 2024-08-06 PROBLEM — Z86.79 PERSONAL HISTORY OF OTHER DISEASES OF THE CIRCULATORY SYSTEM: Chronic | Status: ACTIVE | Noted: 2023-10-18

## 2024-12-24 ENCOUNTER — EMERGENCY (EMERGENCY)
Facility: HOSPITAL | Age: 79
LOS: 0 days | Discharge: LEFT BEFORE DISCHARGE | End: 2024-12-24
Attending: STUDENT IN AN ORGANIZED HEALTH CARE EDUCATION/TRAINING PROGRAM
Payer: MEDICARE

## 2024-12-24 VITALS
TEMPERATURE: 98 F | OXYGEN SATURATION: 97 % | WEIGHT: 113.98 LBS | DIASTOLIC BLOOD PRESSURE: 60 MMHG | RESPIRATION RATE: 18 BRPM | HEART RATE: 74 BPM | HEIGHT: 65 IN | SYSTOLIC BLOOD PRESSURE: 162 MMHG

## 2024-12-24 DIAGNOSIS — Z90.49 ACQUIRED ABSENCE OF OTHER SPECIFIED PARTS OF DIGESTIVE TRACT: Chronic | ICD-10-CM

## 2024-12-24 DIAGNOSIS — R42 DIZZINESS AND GIDDINESS: ICD-10-CM

## 2024-12-24 DIAGNOSIS — Z53.29 PROCEDURE AND TREATMENT NOT CARRIED OUT BECAUSE OF PATIENT'S DECISION FOR OTHER REASONS: ICD-10-CM

## 2024-12-24 DIAGNOSIS — J44.9 CHRONIC OBSTRUCTIVE PULMONARY DISEASE, UNSPECIFIED: ICD-10-CM

## 2024-12-24 DIAGNOSIS — E11.9 TYPE 2 DIABETES MELLITUS WITHOUT COMPLICATIONS: ICD-10-CM

## 2024-12-24 DIAGNOSIS — F17.200 NICOTINE DEPENDENCE, UNSPECIFIED, UNCOMPLICATED: ICD-10-CM

## 2024-12-24 DIAGNOSIS — I10 ESSENTIAL (PRIMARY) HYPERTENSION: ICD-10-CM

## 2024-12-24 LAB
ALBUMIN SERPL ELPH-MCNC: 4.3 G/DL — SIGNIFICANT CHANGE UP (ref 3.5–5.2)
ALP SERPL-CCNC: 62 U/L — SIGNIFICANT CHANGE UP (ref 30–115)
ALT FLD-CCNC: 20 U/L — SIGNIFICANT CHANGE UP (ref 0–41)
ANION GAP SERPL CALC-SCNC: 8 MMOL/L — SIGNIFICANT CHANGE UP (ref 7–14)
APTT BLD: 31.9 SEC — SIGNIFICANT CHANGE UP (ref 27–39.2)
AST SERPL-CCNC: 28 U/L — SIGNIFICANT CHANGE UP (ref 0–41)
BASOPHILS # BLD AUTO: 0.06 K/UL — SIGNIFICANT CHANGE UP (ref 0–0.2)
BASOPHILS NFR BLD AUTO: 1.4 % — HIGH (ref 0–1)
BILIRUB SERPL-MCNC: <0.2 MG/DL — SIGNIFICANT CHANGE UP (ref 0.2–1.2)
BUN SERPL-MCNC: 23 MG/DL — HIGH (ref 10–20)
CALCIUM SERPL-MCNC: 9 MG/DL — SIGNIFICANT CHANGE UP (ref 8.4–10.5)
CHLORIDE SERPL-SCNC: 104 MMOL/L — SIGNIFICANT CHANGE UP (ref 98–110)
CO2 SERPL-SCNC: 28 MMOL/L — SIGNIFICANT CHANGE UP (ref 17–32)
CREAT SERPL-MCNC: 0.8 MG/DL — SIGNIFICANT CHANGE UP (ref 0.7–1.5)
EGFR: 76 ML/MIN/1.73M2 — SIGNIFICANT CHANGE UP
EOSINOPHIL # BLD AUTO: 0.05 K/UL — SIGNIFICANT CHANGE UP (ref 0–0.7)
EOSINOPHIL NFR BLD AUTO: 1.2 % — SIGNIFICANT CHANGE UP (ref 0–8)
GLUCOSE SERPL-MCNC: 109 MG/DL — HIGH (ref 70–99)
HCT VFR BLD CALC: 37.1 % — SIGNIFICANT CHANGE UP (ref 37–47)
HGB BLD-MCNC: 11.8 G/DL — LOW (ref 12–16)
IMM GRANULOCYTES NFR BLD AUTO: 0.2 % — SIGNIFICANT CHANGE UP (ref 0.1–0.3)
INR BLD: 1.02 RATIO — SIGNIFICANT CHANGE UP (ref 0.65–1.3)
LYMPHOCYTES # BLD AUTO: 2.08 K/UL — SIGNIFICANT CHANGE UP (ref 1.2–3.4)
LYMPHOCYTES # BLD AUTO: 47.9 % — SIGNIFICANT CHANGE UP (ref 20.5–51.1)
MCHC RBC-ENTMCNC: 29.8 PG — SIGNIFICANT CHANGE UP (ref 27–31)
MCHC RBC-ENTMCNC: 31.8 G/DL — LOW (ref 32–37)
MCV RBC AUTO: 93.7 FL — SIGNIFICANT CHANGE UP (ref 81–99)
MONOCYTES # BLD AUTO: 0.58 K/UL — SIGNIFICANT CHANGE UP (ref 0.1–0.6)
MONOCYTES NFR BLD AUTO: 13.4 % — HIGH (ref 1.7–9.3)
NEUTROPHILS # BLD AUTO: 1.56 K/UL — SIGNIFICANT CHANGE UP (ref 1.4–6.5)
NEUTROPHILS NFR BLD AUTO: 35.9 % — LOW (ref 42.2–75.2)
NRBC # BLD: 0 /100 WBCS — SIGNIFICANT CHANGE UP (ref 0–0)
PLATELET # BLD AUTO: 142 K/UL — SIGNIFICANT CHANGE UP (ref 130–400)
PMV BLD: 11 FL — HIGH (ref 7.4–10.4)
POTASSIUM SERPL-MCNC: 4.1 MMOL/L — SIGNIFICANT CHANGE UP (ref 3.5–5)
POTASSIUM SERPL-SCNC: 4.1 MMOL/L — SIGNIFICANT CHANGE UP (ref 3.5–5)
PROT SERPL-MCNC: 6.2 G/DL — SIGNIFICANT CHANGE UP (ref 6–8)
PROTHROM AB SERPL-ACNC: 12.1 SEC — SIGNIFICANT CHANGE UP (ref 9.95–12.87)
RBC # BLD: 3.96 M/UL — LOW (ref 4.2–5.4)
RBC # FLD: 13.9 % — SIGNIFICANT CHANGE UP (ref 11.5–14.5)
SODIUM SERPL-SCNC: 140 MMOL/L — SIGNIFICANT CHANGE UP (ref 135–146)
TROPONIN T, HIGH SENSITIVITY RESULT: 55 NG/L — CRITICAL HIGH (ref 6–13)
WBC # BLD: 4.34 K/UL — LOW (ref 4.8–10.8)
WBC # FLD AUTO: 4.34 K/UL — LOW (ref 4.8–10.8)

## 2024-12-24 PROCEDURE — 80053 COMPREHEN METABOLIC PANEL: CPT

## 2024-12-24 PROCEDURE — 84484 ASSAY OF TROPONIN QUANT: CPT

## 2024-12-24 PROCEDURE — 85025 COMPLETE CBC W/AUTO DIFF WBC: CPT

## 2024-12-24 PROCEDURE — 85610 PROTHROMBIN TIME: CPT

## 2024-12-24 PROCEDURE — 36415 COLL VENOUS BLD VENIPUNCTURE: CPT

## 2024-12-24 PROCEDURE — 99285 EMERGENCY DEPT VISIT HI MDM: CPT | Mod: 25

## 2024-12-24 PROCEDURE — 36000 PLACE NEEDLE IN VEIN: CPT

## 2024-12-24 PROCEDURE — 99285 EMERGENCY DEPT VISIT HI MDM: CPT | Mod: GC

## 2024-12-24 PROCEDURE — 93005 ELECTROCARDIOGRAM TRACING: CPT

## 2024-12-24 PROCEDURE — 85730 THROMBOPLASTIN TIME PARTIAL: CPT

## 2024-12-24 PROCEDURE — 93010 ELECTROCARDIOGRAM REPORT: CPT

## 2024-12-24 NOTE — ED PROVIDER NOTE - PHYSICAL EXAMINATION
Constitutional: Well developed, well nourished, no acute distress  Head: Normocephalic, Atraumatic  Eyes: PERRLA, EOMI, conjunctiva and sclera WNL  ENT: Moist mucous membranes, no rhinorrhea,    Neck: Supple, Nontender,    Respiratory: Normal chest excursion with respiration; Breath sounds clear and equal B/L; No wheezes, rales, or rhonchi   Cardiovascular: RRR; Normal S1, S2; No murmurs, rubs or gallops   ABD/GI:   Nondistended; Nontender; No guarding, rigidity or rebound;   EXT/MS: Moving all extremities; Distal pulses 2+ B/L; No peripheral edema  Skin: Normal for age and race; Warm and dry   Neurologic: AAO x 4; CN 2-12 intact; Normal motor and sensory function  Psychiatric: Appropriate affect, normal mood

## 2024-12-24 NOTE — ED PROVIDER NOTE - CLINICAL SUMMARY MEDICAL DECISION MAKING FREE TEXT BOX
Patient presents today with dizziness.  Found to have elevated troponin.  EKG is interpreted by myself, Dr. Mullen,  noted to have no ST segment changes.  Patient left AGAINST MEDICAL ADVICE prior to completion of workup.    The patient wishes to leave against medical advice.  I have discussed the risks, benefits and alternatives (including the possibility of worsening of disease, pain, permanent disability, and/or death) with the patient and his/her family (if available).  The patient voices understanding of these risks, benefits, and alternatives and still wishes to sign out against medical advice.  The patient is awake, alert, oriented  x 3 and has demonstrated capacity to refuse/direct care.  I have advised the patient that they can and should return immediately should they develop any worse/different/additional symptoms, or if they change their mind and want to continue their care.

## 2024-12-24 NOTE — ED PROVIDER NOTE - OBJECTIVE STATEMENT
79-year-old female with past medical history of COPD active smoker, diabetes, hypertension anemia, carotid stenosis status post repair with no stenting, diverticulitis presenting with presyncope.  Patient reports she was taking a hot shower following which she felt lightheaded and unsteady, she felt she was going to pass out.  Patient had to lay down in the bed.  15 minutes before she felt better.  Patient endorses intermittent abdominal the event.  Patient is asymptomatic at this time.  Denies any headache, dizziness, illness, chest pain, shortness of breath, blurry vision, nausea, vomiting, pain,  symptoms.  Patient states she was recently started on amlodipine 3 weeks ago and when she felt lightheaded she took her blood pressure and reports it being 101/57.

## 2024-12-24 NOTE — ED PROVIDER NOTE - PROGRESS NOTE DETAILS
Patient unwilling to stay for further testing, risks discussed with patient about leaving AMA patient opting to leave AMA and states she will return tomorrow to complete her evaluation.  Patient understanding of all risks involved.

## 2024-12-24 NOTE — ED PROVIDER NOTE - PATIENT PORTAL LINK FT
You can access the FollowMyHealth Patient Portal offered by [facility] by registering at the following website: [email or web address]. By joining [email or web address]’s FollowMyHealth portal, you will also be able to view your health information using other applications (apps) compatible with our system.

## 2024-12-25 ENCOUNTER — EMERGENCY (EMERGENCY)
Facility: HOSPITAL | Age: 79
LOS: 0 days | Discharge: LEFT BEFORE DISCHARGE | End: 2024-12-25
Attending: EMERGENCY MEDICINE
Payer: MEDICARE

## 2024-12-25 VITALS
OXYGEN SATURATION: 99 % | RESPIRATION RATE: 18 BRPM | TEMPERATURE: 98 F | DIASTOLIC BLOOD PRESSURE: 65 MMHG | HEART RATE: 71 BPM | SYSTOLIC BLOOD PRESSURE: 125 MMHG

## 2024-12-25 VITALS — WEIGHT: 119.93 LBS | HEIGHT: 65 IN

## 2024-12-25 DIAGNOSIS — Z53.29 PROCEDURE AND TREATMENT NOT CARRIED OUT BECAUSE OF PATIENT'S DECISION FOR OTHER REASONS: ICD-10-CM

## 2024-12-25 DIAGNOSIS — I10 ESSENTIAL (PRIMARY) HYPERTENSION: ICD-10-CM

## 2024-12-25 DIAGNOSIS — F17.200 NICOTINE DEPENDENCE, UNSPECIFIED, UNCOMPLICATED: ICD-10-CM

## 2024-12-25 DIAGNOSIS — R42 DIZZINESS AND GIDDINESS: ICD-10-CM

## 2024-12-25 DIAGNOSIS — E11.9 TYPE 2 DIABETES MELLITUS WITHOUT COMPLICATIONS: ICD-10-CM

## 2024-12-25 DIAGNOSIS — R79.89 OTHER SPECIFIED ABNORMAL FINDINGS OF BLOOD CHEMISTRY: ICD-10-CM

## 2024-12-25 DIAGNOSIS — Z90.49 ACQUIRED ABSENCE OF OTHER SPECIFIED PARTS OF DIGESTIVE TRACT: Chronic | ICD-10-CM

## 2024-12-25 DIAGNOSIS — Z86.79 PERSONAL HISTORY OF OTHER DISEASES OF THE CIRCULATORY SYSTEM: ICD-10-CM

## 2024-12-25 DIAGNOSIS — J44.9 CHRONIC OBSTRUCTIVE PULMONARY DISEASE, UNSPECIFIED: ICD-10-CM

## 2024-12-25 LAB
ALBUMIN SERPL ELPH-MCNC: 4.4 G/DL — SIGNIFICANT CHANGE UP (ref 3.5–5.2)
ALP SERPL-CCNC: 64 U/L — SIGNIFICANT CHANGE UP (ref 30–115)
ALT FLD-CCNC: 22 U/L — SIGNIFICANT CHANGE UP (ref 0–41)
ANION GAP SERPL CALC-SCNC: 9 MMOL/L — SIGNIFICANT CHANGE UP (ref 7–14)
AST SERPL-CCNC: 30 U/L — SIGNIFICANT CHANGE UP (ref 0–41)
BASOPHILS # BLD AUTO: 0.05 K/UL — SIGNIFICANT CHANGE UP (ref 0–0.2)
BASOPHILS NFR BLD AUTO: 0.5 % — SIGNIFICANT CHANGE UP (ref 0–1)
BILIRUB SERPL-MCNC: 0.2 MG/DL — SIGNIFICANT CHANGE UP (ref 0.2–1.2)
BUN SERPL-MCNC: 20 MG/DL — SIGNIFICANT CHANGE UP (ref 10–20)
CALCIUM SERPL-MCNC: 9.2 MG/DL — SIGNIFICANT CHANGE UP (ref 8.4–10.5)
CHLORIDE SERPL-SCNC: 101 MMOL/L — SIGNIFICANT CHANGE UP (ref 98–110)
CO2 SERPL-SCNC: 29 MMOL/L — SIGNIFICANT CHANGE UP (ref 17–32)
CREAT SERPL-MCNC: 0.7 MG/DL — SIGNIFICANT CHANGE UP (ref 0.7–1.5)
EGFR: 89 ML/MIN/1.73M2 — SIGNIFICANT CHANGE UP
EOSINOPHIL # BLD AUTO: 0.16 K/UL — SIGNIFICANT CHANGE UP (ref 0–0.7)
EOSINOPHIL NFR BLD AUTO: 1.5 % — SIGNIFICANT CHANGE UP (ref 0–8)
GLUCOSE SERPL-MCNC: 94 MG/DL — SIGNIFICANT CHANGE UP (ref 70–99)
HCT VFR BLD CALC: 39 % — SIGNIFICANT CHANGE UP (ref 37–47)
HGB BLD-MCNC: 12.7 G/DL — SIGNIFICANT CHANGE UP (ref 12–16)
IMM GRANULOCYTES NFR BLD AUTO: 0.5 % — HIGH (ref 0.1–0.3)
LYMPHOCYTES # BLD AUTO: 2.5 K/UL — SIGNIFICANT CHANGE UP (ref 1.2–3.4)
LYMPHOCYTES # BLD AUTO: 24.2 % — SIGNIFICANT CHANGE UP (ref 20.5–51.1)
MAGNESIUM SERPL-MCNC: 2.1 MG/DL — SIGNIFICANT CHANGE UP (ref 1.8–2.4)
MCHC RBC-ENTMCNC: 30.2 PG — SIGNIFICANT CHANGE UP (ref 27–31)
MCHC RBC-ENTMCNC: 32.6 G/DL — SIGNIFICANT CHANGE UP (ref 32–37)
MCV RBC AUTO: 92.6 FL — SIGNIFICANT CHANGE UP (ref 81–99)
MONOCYTES # BLD AUTO: 0.75 K/UL — HIGH (ref 0.1–0.6)
MONOCYTES NFR BLD AUTO: 7.3 % — SIGNIFICANT CHANGE UP (ref 1.7–9.3)
NEUTROPHILS # BLD AUTO: 6.83 K/UL — HIGH (ref 1.4–6.5)
NEUTROPHILS NFR BLD AUTO: 66 % — SIGNIFICANT CHANGE UP (ref 42.2–75.2)
NRBC # BLD: 0 /100 WBCS — SIGNIFICANT CHANGE UP (ref 0–0)
PLATELET # BLD AUTO: 147 K/UL — SIGNIFICANT CHANGE UP (ref 130–400)
PMV BLD: 10.8 FL — HIGH (ref 7.4–10.4)
POTASSIUM SERPL-MCNC: 4.4 MMOL/L — SIGNIFICANT CHANGE UP (ref 3.5–5)
POTASSIUM SERPL-SCNC: 4.4 MMOL/L — SIGNIFICANT CHANGE UP (ref 3.5–5)
PROT SERPL-MCNC: 6.6 G/DL — SIGNIFICANT CHANGE UP (ref 6–8)
RBC # BLD: 4.21 M/UL — SIGNIFICANT CHANGE UP (ref 4.2–5.4)
RBC # FLD: 14.2 % — SIGNIFICANT CHANGE UP (ref 11.5–14.5)
SODIUM SERPL-SCNC: 139 MMOL/L — SIGNIFICANT CHANGE UP (ref 135–146)
TROPONIN T, HIGH SENSITIVITY RESULT: 46 NG/L — HIGH (ref 6–13)
WBC # BLD: 10.34 K/UL — SIGNIFICANT CHANGE UP (ref 4.8–10.8)
WBC # FLD AUTO: 10.34 K/UL — SIGNIFICANT CHANGE UP (ref 4.8–10.8)

## 2024-12-25 PROCEDURE — 36415 COLL VENOUS BLD VENIPUNCTURE: CPT

## 2024-12-25 PROCEDURE — 71045 X-RAY EXAM CHEST 1 VIEW: CPT

## 2024-12-25 PROCEDURE — 99285 EMERGENCY DEPT VISIT HI MDM: CPT | Mod: 25

## 2024-12-25 PROCEDURE — 83735 ASSAY OF MAGNESIUM: CPT

## 2024-12-25 PROCEDURE — 85025 COMPLETE CBC W/AUTO DIFF WBC: CPT

## 2024-12-25 PROCEDURE — 80053 COMPREHEN METABOLIC PANEL: CPT

## 2024-12-25 PROCEDURE — 36000 PLACE NEEDLE IN VEIN: CPT

## 2024-12-25 PROCEDURE — 93005 ELECTROCARDIOGRAM TRACING: CPT

## 2024-12-25 PROCEDURE — 84484 ASSAY OF TROPONIN QUANT: CPT

## 2024-12-25 PROCEDURE — 71045 X-RAY EXAM CHEST 1 VIEW: CPT | Mod: 26

## 2024-12-25 PROCEDURE — 93010 ELECTROCARDIOGRAM REPORT: CPT

## 2024-12-25 PROCEDURE — 99285 EMERGENCY DEPT VISIT HI MDM: CPT | Mod: FS

## 2024-12-25 NOTE — ED ADULT TRIAGE NOTE - WEIGHT IN LBS
"Reason for Neurology Consult:  Memory Difficulties    History of present illness:    Tatiana Ziegler Zepeda 89 y.o.right handed woman who was is  Retired .  Here with son.    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    She has noticed that she will forget what day it is sometimes and her subjective view is that this situation has progressed because she has noticed going into a room and forgetting what she went into to get.    Son has noticed started about 2 years ago Tatiana was starting to have repetitive conversations (saying things again within 10 minutes) and forgetting to take her medication(s). Son has recognized that her mother has problems recalling dates and times for over 1 year. She is suppose to take her medication(s)- thyroid medication which she has been taking 60% of the time.    No subjective depression,dysphagia,dysarthria,involuntary movements of the limbs, sensory disturbances of the feet-toes, REM Sleep behavioral issues, ongoing or evolving neuropathic pain(s), headaches.    Average sleep- 7-8 hours most nights in the recent months.    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   • CATARACT     bilat iol   • Dental disorder     implants   • Glaucoma     bilat   • Hepatitis C     tx interferon 10years ago   • High cholesterol    • Indigestion    • Jaundice    • Pain 09-11-12    left hip, 6/10   • Personal history of venous " thrombosis and embolism 1964    varicose vein broke open   • Unspecified disorder of thyroid        Past surgical history:   Past Surgical History:   Procedure Laterality Date   • SPLIT THICKNESS SKIN GRAFT N/A 11/7/2017    Procedure: SPLIT THICKNESS SKIN GRAFT - SCALP FLAP W/STSG;  Surgeon: Scott Wagoner Jr., M.D.;  Location: SURGERY SAME DAY [facility];  Service: Plastics   • TENDON REPAIR  9/12/2012    Performed by AARON OLIVIER at SURGERY [facility] ORS   • KNEE ARTHROPLASTY TOTAL  4/5/2012    Performed by AARON OLIVIER at SURGERY [facility] ORS   • OTHER      dental implant   • OTHER      adamaris IOL         Social history:   Social History     Socioeconomic History   • Marital status:      Spouse name: Not on file   • Number of children: Not on file   • Years of education: Not on file   • Highest education level: Not on file   Occupational History   • Not on file   Tobacco Use   • Smoking status: Never Smoker   • Smokeless tobacco: Never Used   Substance and Sexual Activity   • Alcohol use: No   • Drug use: No   • Sexual activity: Not on file   Other Topics Concern   • Not on file   Social History Narrative   • Not on file     Social Determinants of Health     Financial Resource Strain:    • Difficulty of Paying Living Expenses:    Food Insecurity:    • Worried About Running Out of Food in the Last Year:    • Ran Out of Food in the Last Year:    Transportation Needs:    • Lack of Transportation (Medical):    • Lack of Transportation (Non-Medical):    Physical Activity:    • Days of Exercise per Week:    • Minutes of Exercise per Session:    Stress:    • Feeling of Stress :    Social Connections:    • Frequency of Communication with Friends and Family:    • Frequency of Social Gatherings with Friends and Family:    • Attends Restoration Services:    • Active Member of Clubs or Organizations:    • Attends Club or Organization Meetings:    • Marital Status:    Intimate Partner Violence:    • Fear of  Current or Ex-Partner:    • Emotionally Abused:    • Physically Abused:    • Sexually Abused:        Family history:   History reviewed. No pertinent family history.      Current medications:   Current Outpatient Medications   Medication   • COMBIGAN 0.2-0.5 % Solution   • acetaminophen (TYLENOL) 325 MG Tab   • oxycodone-acetaminophen (PERCOCET) 7.5-325 MG per tablet   • levothyroxine (SYNTHROID) 50 MCG TABS     No current facility-administered medications for this visit.       Medication Allergy:  No Known Allergies        ROS:  (In the last 2-4 weeks unless otherwise stated for an additional period of time).    Constitutional: Denies fevers,chills,sweats,involuntary and unprovoked/progressive  weight loss.  Eyes: Denies changes in vision (blurring,double or loss of) nor any eye pain(s)- static,evolving or with eye movements.  Ears/Nose/Throat/Mouth: Denies nasal congestion,sore throat,ear pain(s) or changes in hearing ability.  No tinnitus.  Cardiovascular: Denies chest pain/pressure at rest or with exertion such as climbing stairs or walking. No  palpitations. There has been no  orthostatic lightheadedness/faintness events.  Respiratory: Denies SOB with exertion or when sleeping (while laying down).  Gastrointestinal/Hepatic: Denies abdominal pain, nausea, vomiting, diarrhea, constipation or GI bleeding   Genitourinary: Denies bladder dysfunction, dysuria or frequency   Musculoskeletal/Rheum: Denies joint pain and swelling   Skin/Breast: Denies rash, denies breast lumps or discharge   Neurological: Denies new or evolving headaches, new or evolving confusion/disorientation, seizure like events, or focal weakness/parasthesias.  There has been no falls or near falls.  Psychiatric: Denies feeling anxious,depressed,suicidal or having auditory/visual hallucinations.  Endocrine: Denies hx of diabetes or thyroid dysfunction   Heme/Oncology/ Denies easy or spontaneous bruising/bleeding from nose,skin  or a known bleeding  "disorder   Allergic/Immunologic: Denies hx of allergies         Physical examination:   Vitals:    04/27/21 1459   BP: 126/74   BP Location: Right arm   Patient Position: Sitting   BP Cuff Size: Adult   Pulse: (!) 116   Temp: 36.7 °C (98.1 °F)   TempSrc: Temporal   SpO2: 96%   Weight: 55 kg (121 lb 4.1 oz)   Height: 1.651 m (5' 5\")       Normal Cephalic Atraumatic.  General: Full Range of Movement around the Neck in all directions without restrictions or discrete pain evoked triggers.  No lower extremity edema.      Neurological  Exam:      [place] Cognitive Assessment (MOCA) Version 7.1    Years of Education: 8th grade    TOTAL SCORE: 14/30  (scanned into Media Section)        Mental status: Awake, alert and fully oriented to person, place, time and situation. Normal attention and concentration.  Did not appear/act combative,irritable,anxious,paranoid/delusional or aggressive to or with me.    Speech and language: Speech is fluent without errors, clear, intact to repetition and intact to naming.     Follows 3 step motor commands in sequence without significant delay and correctly.    Cranial nerve exam:  II: Pupils are equally round and reactive to light. Visual fields are intact by confrontation.  III, IV, VI: EOMI, no diplopia, no ptosis.  V: Sensation to light touch is normal over V1-3 distributions bilaterally.   and Jaw jerk is not present.  VII: Facial movements are symmetrical. There is no facial droop. .  VIII: Hearing intact to soft speech and finger rub bilaterally  IX: Palate elevates symmetrically, uvula is midline. Dysarthria is not present.  XI: Shoulder shrug are symmetrical and strong.   XII: Tongue protrudes midline.      Motor exam:  Muscle tone is normal in all 4 limbs. and No abnormal movements appreciated.    Muscle strength:    Neck Flexors/Extensors: 5/5       Right  Left  Deltoid   5/5  5/5      Biceps   5/5  5/5  Triceps  5/5  5/5   Wrist extensors 5/5  5/5  Wrist " flexors  5/5  5/5     5/5  5/5  Interossei  5/5  5/5  Thenar (APB)  5/5  5/5   Hip flexors  5/5  5/5  Quadriceps  5/5  5/5    Hamstrings  5/5  5/5  Dorsiflexors  5/5  5/5  Plantarflexors  5/5  5/5  Toe extension  5/5  5/5  NT = not tested    Sensory exam:  Intact to Vibration and Proprioception in bilateral lower extremity.      Reflexes:       Right  Left  Biceps   2/4  2/4  Triceps  2/4  2/4  Brachioradialis 2/4  2/4  Knee jerk  2/4  2/4  Ankle jerk  2/4 2/4     Frontal release signs are absent.    bilaterally toes are downgoing to plantar stimulation..    Coordination (finger-to-nose, heel/knee/shin, rapid alternating movements) was normal.     There was no ataxia, no tremors, and no dysmetria.     Station and gait> easily stands up from exam chair without retropulsion,veering,leaning,swaying (to either side).       Labs and Tests:    CMP, TSH and CBC: wnl (per son).     NEUROIMAGING:         Impression/Plans/Recommendations:    1. 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  Most likely specific  cause is Alz Type Dementia.  Not parkinsonian.    No history of concussion(s),alcohol dependence issues or family history.    Global Deterioration Scale Score of 4 to 5 range.    MOCA score of 14/30 today    Onset over 2 years ago without behavioral changes.    I had a conversation with the son and Tatiana about any type or form of Brain Imaging (CT vs MRI vs PET scan)- son and Tatiana agree or do not want to pursue such imaging (remote possibility of a brain tumor discussed but in this age group like a neurodegenerative pattern such as atrophy).    Today, I reviewed the clinical criteria and reviewed several  scenarios of the differences being using the medical terms of normal brain aging (age associated memory impairment),  Mild Cognitive Impairment (MCI) and Dementia.    Dementia  is a syndrome but statistically and for the majority of patients  occurs due  to a more rapid aging of the  "brain tissue or potentially from injury to certain parts of one's brain ( often from such contributing factors as  the cumulative effects of alcohol, from one or more ischemic or hemmorhagic stroke(s), from neurodegeneration or long standing with/without suboptimally controlled Hypertension). It is for some of these potential factors as to why I recommend a brain imaging test (Head CT or Brain MRI) be done for the 1st time or in certain circumstances repeated for comparison purposes  as such imaging can suggest one or more factors as to the reason(s) for the person's cognitive/memory changes. In fact, a normal or \"age related\" finding on a brain imaging test in and of itself is useful clinical and objective information to have in the evaluation of a person who has either an acute, evolving or even chronic (months to years) long cognitive/memory complaint.    Additional factors or contributors to Brain Health issues can be summarized in several books/references which I discussed as well today.     Goals going forwards include:    A. Paying attention to one's risk factors and reducing their prevalence or potential impact on one's changing memory/thinking> an excellent example would be to stop smoking, reduce or eliminate alcohol use (depending on the amount and frequency of usage), maintain good blood pressure control by buying a digital arm blood pressure cuff such as an OMRON Series 3 or 5 and checking one's blood pressure atleast 3 days per week (in the am and early afternoon) that the numbers are under 140/90 and working as needed with the primary care doctor  to optimize blood pressure control).    B. Encouraging proper sleep hygiene which for most adults is 7 to 8 hours of uninterrupted sleep per night.      C. Encouraging some form of exercise preferable aerobic forms to be done (4 to 5 days per week- 30 minutes minimum per day)> 150 minutes per week as a goal. Example activities could include fast walking (up " a slight incline), jogging, cycling (road or stationary), swimming,tennis. Essentially, even basic walking on a flat surface or a treadmill would be better than doing nothing.    D. Maintaining or forming new social contacts with family and friends  and a positive attitude despite the concerns and/or ongoing issues with thinking and/or memory.    E. Eating well which means a diet low in salt  (under 2 grams per day), sugar and saturated fat.    F. Maintaining one's BMI (Body Mass Index) under 30.    G. Consideration of the use of cognitive enhancers (acetylcholinerase inhibitors such as Aricept vs an NMDA Receptor agent like Namenda). Pros and cons of such compounds in terms of predicted efficacy and side effects profiles reviewed.  Son and Tatiana agrees not to pursue such cognitive enhancing medications.      I have performed  a history and physical exam and a directed /focused  ROS today.    Total time spent today or this patient's care was 47  minutes and   and included reviewing diagnostic workup to date (labs and imaging that include interpreting such tests relevant to this patient's neurological condition),  reviewing/obtaining separately obtained history (from patient and/or accompanying friend/family member/caregiver)  for today's neurological problem(s) , ordering either/or medications and additioning tests, counseling and educating the patient/family/caregiver, documenting clinical information in the EMR.    Follow up PRN at this point.        Rico Santa MD  [place] [facility].   [facility]  Office: [phone]  Fax: [phone]   119.9

## 2024-12-25 NOTE — ED PROVIDER NOTE - ATTENDING APP SHARED VISIT CONTRIBUTION OF CARE
80 yo F pmh of COPD no home O2, active smoker, HTN, DM, anemia presents after getting a call that her troponin was elevated. Patient came to the ED yesterday after an episode of light headidness. States that she took a shower, shortly after had a 20 minute episode of light headed, palpitations, flushed and sweating. no chest pain or shortness of breath during the episode. Came to the ED at that time. Had some testing done but signed out AMA prior to completion of her evaluation. States that she got a call that her troponin was high and should come back in for evaluation. no n/v. no abd pain. no fever. no leg swelling or pain. No additional episodes since being home. Follows with Dr. Trevino, last stress test was 2 yrs ago.     CONSTITUTIONAL: Well-developed; well-nourished; in no acute distress.   SKIN: warm, dry  HEAD: Normocephalic; atraumatic.  EYES: PERRL, EOMI, no conjunctival erythema  ENT: No nasal discharge; airway clear.  NECK: Supple; non tender.  CARD: S1, S2 normal;  Regular rate and rhythm.   RESP: No wheezes, rales or rhonchi.  ABD: soft non tender, non distended, no rebound or guarding  EXT: Normal ROM.  5/5 strength in all 4 extremities. no pedal edema or calf tenderness.   LYMPH: No acute cervical adenopathy.  NEURO: Alert, oriented, grossly unremarkable. neurovascularly intact  PSYCH: Cooperative, appropriate.

## 2024-12-25 NOTE — ED PROVIDER NOTE - CLINICAL SUMMARY MEDICAL DECISION MAKING FREE TEXT BOX
Patient presents after elevated troponin yesterday.  Signed out AMA at that time.  Patient return today.  Labs EKG x-ray done.  Troponin still elevated.  Attempted to reach Dr. chavez to come up with plan.  Patient does not want a stay at this time.  Signing out AMA.  All risk discussed.  Understands need to follow-up with Dr. chavez .  Return precautions discussed.

## 2024-12-25 NOTE — ED PROVIDER NOTE - NSFOLLOWUPINSTRUCTIONS_ED_ALL_ED_FT
Follow up with your primary care doctor and cardiologist Dr. Trevino as soon as possible      YOU ARE SIGNING OUT AGAINST MEDICAL ADVICE YOUR TROPONIN LEVEL IS ELEVATED AND YOU COULD BE HAVING A HEART ATTACK.     IF YOU GET CHEST PAIN, SHORTNESS OF BREATH, BACK PAIN, FEEL LIKE YOUR GOING TO PASS OUT OR ANY OTHER CONCERN RETURN TO ER IMMEDIATELY OR CALL 911.

## 2024-12-25 NOTE — ED ADULT NURSE NOTE - PRO INTERPRETER NEED 2
Do Not Use If Visit Has Modifier 25 And Other Service Is Not A Preventive Service, Immunization, Or Annual Wellness Visit: : Visit complexity inherent to evaluation and management associated with medical care services that serve as the continuing focal point for all needed health care services and/or with medical care services that are part of ongoing care related to a patient’s single, serious, or complex chronic condition Indication: Provided medical care services as part of ongoing care related to the patient's single, serious or complex chronic condition. Detail Level: Simple English

## 2024-12-25 NOTE — ED PROVIDER NOTE - NSCAREINITIATED _GEN_ER
MD Vasques Media to bedside for evaluation. Per other RN, Tracy Warren,  called out and sts that pt is having a seizure. Pt had clenching of jaw and slight movement in hand. Pt was rolled onto side. Pt O2 remained in mid 90's. HR wnl. Pt then came out of it and was said to possibly have a postictal state. Currently, pt is communicating without delay, A&Ox4.       Ash Taylor, [address]  10/23/23 7508 Dorene Vance(Attending)

## 2024-12-25 NOTE — ED ADULT NURSE NOTE - NSFALLUNIVINTERV_ED_ALL_ED
Bed/Stretcher in lowest position, wheels locked, appropriate side rails in place/Call bell, personal items and telephone in reach/Instruct patient to call for assistance before getting out of bed/chair/stretcher/Non-slip footwear applied when patient is off stretcher/[place] to call system/Physically safe environment - no spills, clutter or unnecessary equipment/Purposeful proactive rounding/Room/bathroom lighting operational, light cord in reach

## 2024-12-25 NOTE — ED PROVIDER NOTE - PROGRESS NOTE DETAILS
English Pt no longer wants to wait for Dr. Trevino to call back and does not want another round of blood work. patient wants to sign ama. D/w patient risk of AMA including heart attack and death, spouse at beside. patient aware and verbalizes understand and wants to sign ama. Call to Dr. Trevino x 2 and message left.

## 2024-12-25 NOTE — ED PROVIDER NOTE - OBJECTIVE STATEMENT
79 year old female past medical history of COPD active smoker, diabetes, hypertension anemia, carotid stenosis status post repair with no stenting, diverticulitis came to emergency room yesterday for almost passing out.  Patient reports she was taking a hot shower following which she felt lightheaded and unsteady, she felt she was going to pass out.  Patient had to lay down in the bed.  15 minutes before she felt better. Pt was in emergency room yesterday left AMA and received call that her Trop was 55 to come back. patient states since leaving hospital has felt fine. no chest pain no shortness of breath no abd pain. no syncope or near.

## 2024-12-26 ENCOUNTER — EMERGENCY (EMERGENCY)
Facility: HOSPITAL | Age: 79
LOS: 0 days | Discharge: HOME | End: 2024-12-27
Attending: EMERGENCY MEDICINE
Payer: MEDICARE

## 2024-12-26 VITALS
OXYGEN SATURATION: 98 % | HEIGHT: 65 IN | TEMPERATURE: 98 F | DIASTOLIC BLOOD PRESSURE: 50 MMHG | HEART RATE: 83 BPM | RESPIRATION RATE: 20 BRPM | SYSTOLIC BLOOD PRESSURE: 107 MMHG

## 2024-12-26 DIAGNOSIS — E11.9 TYPE 2 DIABETES MELLITUS WITHOUT COMPLICATIONS: ICD-10-CM

## 2024-12-26 DIAGNOSIS — R07.2 PRECORDIAL PAIN: ICD-10-CM

## 2024-12-26 DIAGNOSIS — R06.02 SHORTNESS OF BREATH: ICD-10-CM

## 2024-12-26 DIAGNOSIS — J44.9 CHRONIC OBSTRUCTIVE PULMONARY DISEASE, UNSPECIFIED: ICD-10-CM

## 2024-12-26 DIAGNOSIS — F17.200 NICOTINE DEPENDENCE, UNSPECIFIED, UNCOMPLICATED: ICD-10-CM

## 2024-12-26 DIAGNOSIS — Z90.49 ACQUIRED ABSENCE OF OTHER SPECIFIED PARTS OF DIGESTIVE TRACT: Chronic | ICD-10-CM

## 2024-12-26 DIAGNOSIS — R55 SYNCOPE AND COLLAPSE: ICD-10-CM

## 2024-12-26 DIAGNOSIS — I10 ESSENTIAL (PRIMARY) HYPERTENSION: ICD-10-CM

## 2024-12-26 LAB
ALBUMIN SERPL ELPH-MCNC: 4.1 G/DL — SIGNIFICANT CHANGE UP (ref 3.5–5.2)
ALP SERPL-CCNC: 69 U/L — SIGNIFICANT CHANGE UP (ref 30–115)
ALT FLD-CCNC: 22 U/L — SIGNIFICANT CHANGE UP (ref 0–41)
ANION GAP SERPL CALC-SCNC: 9 MMOL/L — SIGNIFICANT CHANGE UP (ref 7–14)
AST SERPL-CCNC: 56 U/L — HIGH (ref 0–41)
BASOPHILS # BLD AUTO: 0.04 K/UL — SIGNIFICANT CHANGE UP (ref 0–0.2)
BASOPHILS NFR BLD AUTO: 0.8 % — SIGNIFICANT CHANGE UP (ref 0–1)
BILIRUB SERPL-MCNC: <0.2 MG/DL — SIGNIFICANT CHANGE UP (ref 0.2–1.2)
BUN SERPL-MCNC: 19 MG/DL — SIGNIFICANT CHANGE UP (ref 10–20)
CALCIUM SERPL-MCNC: 9 MG/DL — SIGNIFICANT CHANGE UP (ref 8.4–10.5)
CHLORIDE SERPL-SCNC: 105 MMOL/L — SIGNIFICANT CHANGE UP (ref 98–110)
CO2 SERPL-SCNC: 26 MMOL/L — SIGNIFICANT CHANGE UP (ref 17–32)
CREAT SERPL-MCNC: 0.7 MG/DL — SIGNIFICANT CHANGE UP (ref 0.7–1.5)
EGFR: 89 ML/MIN/1.73M2 — SIGNIFICANT CHANGE UP
EOSINOPHIL # BLD AUTO: 0.12 K/UL — SIGNIFICANT CHANGE UP (ref 0–0.7)
EOSINOPHIL NFR BLD AUTO: 2.4 % — SIGNIFICANT CHANGE UP (ref 0–8)
GAS PNL BLDV: SIGNIFICANT CHANGE UP
GLUCOSE SERPL-MCNC: 116 MG/DL — HIGH (ref 70–99)
HCT VFR BLD CALC: 36.7 % — LOW (ref 37–47)
HGB BLD-MCNC: 12.1 G/DL — SIGNIFICANT CHANGE UP (ref 12–16)
IMM GRANULOCYTES NFR BLD AUTO: 0.4 % — HIGH (ref 0.1–0.3)
LYMPHOCYTES # BLD AUTO: 2.12 K/UL — SIGNIFICANT CHANGE UP (ref 1.2–3.4)
LYMPHOCYTES # BLD AUTO: 41.6 % — SIGNIFICANT CHANGE UP (ref 20.5–51.1)
MCHC RBC-ENTMCNC: 31.3 PG — HIGH (ref 27–31)
MCHC RBC-ENTMCNC: 33 G/DL — SIGNIFICANT CHANGE UP (ref 32–37)
MCV RBC AUTO: 95.1 FL — SIGNIFICANT CHANGE UP (ref 81–99)
MONOCYTES # BLD AUTO: 0.44 K/UL — SIGNIFICANT CHANGE UP (ref 0.1–0.6)
MONOCYTES NFR BLD AUTO: 8.6 % — SIGNIFICANT CHANGE UP (ref 1.7–9.3)
NEUTROPHILS # BLD AUTO: 2.36 K/UL — SIGNIFICANT CHANGE UP (ref 1.4–6.5)
NEUTROPHILS NFR BLD AUTO: 46.2 % — SIGNIFICANT CHANGE UP (ref 42.2–75.2)
NRBC # BLD: 0 /100 WBCS — SIGNIFICANT CHANGE UP (ref 0–0)
NT-PROBNP SERPL-SCNC: 211 PG/ML — SIGNIFICANT CHANGE UP (ref 0–300)
PLATELET # BLD AUTO: 134 K/UL — SIGNIFICANT CHANGE UP (ref 130–400)
PMV BLD: 12.3 FL — HIGH (ref 7.4–10.4)
POTASSIUM SERPL-MCNC: 5.6 MMOL/L — HIGH (ref 3.5–5)
POTASSIUM SERPL-SCNC: 5.6 MMOL/L — HIGH (ref 3.5–5)
PROT SERPL-MCNC: 6.4 G/DL — SIGNIFICANT CHANGE UP (ref 6–8)
RBC # BLD: 3.86 M/UL — LOW (ref 4.2–5.4)
RBC # FLD: 14.1 % — SIGNIFICANT CHANGE UP (ref 11.5–14.5)
SODIUM SERPL-SCNC: 140 MMOL/L — SIGNIFICANT CHANGE UP (ref 135–146)
TROPONIN T, HIGH SENSITIVITY RESULT: 42 NG/L — HIGH (ref 6–13)
TROPONIN T, HIGH SENSITIVITY RESULT: 45 NG/L — HIGH (ref 6–13)
WBC # BLD: 5.1 K/UL — SIGNIFICANT CHANGE UP (ref 4.8–10.8)
WBC # FLD AUTO: 5.1 K/UL — SIGNIFICANT CHANGE UP (ref 4.8–10.8)

## 2024-12-26 PROCEDURE — 83880 ASSAY OF NATRIURETIC PEPTIDE: CPT

## 2024-12-26 PROCEDURE — 84484 ASSAY OF TROPONIN QUANT: CPT | Mod: 59

## 2024-12-26 PROCEDURE — G0378: CPT

## 2024-12-26 PROCEDURE — 71046 X-RAY EXAM CHEST 2 VIEWS: CPT | Mod: 26

## 2024-12-26 PROCEDURE — 85014 HEMATOCRIT: CPT

## 2024-12-26 PROCEDURE — 84295 ASSAY OF SERUM SODIUM: CPT

## 2024-12-26 PROCEDURE — 99223 1ST HOSP IP/OBS HIGH 75: CPT | Mod: FS

## 2024-12-26 PROCEDURE — 78452 HT MUSCLE IMAGE SPECT MULT: CPT | Mod: MC

## 2024-12-26 PROCEDURE — A9500: CPT

## 2024-12-26 PROCEDURE — 84132 ASSAY OF SERUM POTASSIUM: CPT

## 2024-12-26 PROCEDURE — 83605 ASSAY OF LACTIC ACID: CPT

## 2024-12-26 PROCEDURE — 36415 COLL VENOUS BLD VENIPUNCTURE: CPT

## 2024-12-26 PROCEDURE — 82330 ASSAY OF CALCIUM: CPT

## 2024-12-26 PROCEDURE — 93005 ELECTROCARDIOGRAM TRACING: CPT

## 2024-12-26 PROCEDURE — 85018 HEMOGLOBIN: CPT

## 2024-12-26 PROCEDURE — 99285 EMERGENCY DEPT VISIT HI MDM: CPT | Mod: 25

## 2024-12-26 PROCEDURE — 71046 X-RAY EXAM CHEST 2 VIEWS: CPT

## 2024-12-26 PROCEDURE — 80053 COMPREHEN METABOLIC PANEL: CPT

## 2024-12-26 PROCEDURE — 82803 BLOOD GASES ANY COMBINATION: CPT

## 2024-12-26 PROCEDURE — 93017 CV STRESS TEST TRACING ONLY: CPT

## 2024-12-26 PROCEDURE — 85025 COMPLETE CBC W/AUTO DIFF WBC: CPT

## 2024-12-26 PROCEDURE — 93010 ELECTROCARDIOGRAM REPORT: CPT

## 2024-12-26 RX ORDER — LOSARTAN POTASSIUM 100 MG/1
50 TABLET, FILM COATED ORAL DAILY
Refills: 0 | Status: DISCONTINUED | OUTPATIENT
Start: 2024-12-26 | End: 2024-12-27

## 2024-12-26 RX ORDER — AMLODIPINE BESYLATE 10 MG/1
5 TABLET ORAL AT BEDTIME
Refills: 0 | Status: DISCONTINUED | OUTPATIENT
Start: 2024-12-26 | End: 2024-12-27

## 2024-12-26 RX ORDER — ROSUVASTATIN CALCIUM 5 MG/1
10 TABLET, FILM COATED ORAL AT BEDTIME
Refills: 0 | Status: DISCONTINUED | OUTPATIENT
Start: 2024-12-26 | End: 2024-12-27

## 2024-12-26 RX ADMIN — Medication 324 MILLIGRAM(S): at 16:30

## 2024-12-26 RX ADMIN — LOSARTAN POTASSIUM 50 MILLIGRAM(S): 100 TABLET, FILM COATED ORAL at 16:30

## 2024-12-26 RX ADMIN — Medication 500 MILLIGRAM(S): at 16:30

## 2024-12-26 NOTE — ED ADULT NURSE NOTE - OBJECTIVE STATEMENT
patient c/o back pain for over one week. denies any chest pain/sob. states she is having a hard time walking

## 2024-12-26 NOTE — ED PROVIDER NOTE - ATTENDING APP SHARED VISIT CONTRIBUTION OF CARE
79-year-old female past medical history of diverticulitis, hypertension, daily smoker presenting for evaluation of chest pain shortness of breath for past 2 days.  Patient developed an episode where she broke out in cold sweat several days ago, came to ED but left AMA.  She returned again, had labs done and again she AMAd because she wanted to spend holidays at home.  She returns today and is amenable with admission/OBS if necessary.  No symptoms today.  Well-appearing well-nourished, NAD, head AT/NC, PERRL, pink conjunctivae,  mmm, nml work of breathing, lungs CTA b/l, equal air entry, RRR, well-perfused extremities, distal pulses intact, abdomen soft, NT/ND, no midline spine or CVA ttp, no leg edema or unilateral calf swelling, A&Ox3, no focal neuro deficits, nml mood and affect.  Plan contraceptive, EKG, labs, OBS for further cardiac workup tomorrow.

## 2024-12-26 NOTE — ED PROVIDER NOTE - OBJECTIVE STATEMENT
79 year old female past medical history of  COPD active smoker, diabetes, hypertension anemia, carotid stenosis status post repair with no stenting, diverticulitis presenting for chest pain and shortness of breath. Patient described the pain ad dull, substernal with associated shortness of breath with exertion. Patient was seen in the ED on 12/24 for presyncope and left AMA. Patient received a call back for elevated troponin of 55, patient reported back to ED on 12/25 troponin at that time was 46 however patient AMA again therefore workup was not completed. Patients states that she has not had a cardiac workup in over a year. Denies any recent LE edema, history of dvt/pe, nausea, vomiting 79 year old female past medical history of  COPD active smoker, diabetes, hypertension anemia, carotid stenosis status post repair with no stenting, diverticulitis presenting for intermittent chest pain and shortness of breath. Patient described the pain ad dull, substernal with associated shortness of breath with exertion. Patient was seen in the ED on 12/24 for presyncope and left AMA. Patient received a call back for elevated troponin of 55, patient reported back to ED on 12/25 troponin at that time was 46 however patient AMA again therefore workup was not completed. Denies any recent LE edema, history of dvt/pe, nausea, vomiting

## 2024-12-26 NOTE — ED PROVIDER NOTE - CLINICAL SUMMARY MEDICAL DECISION MAKING FREE TEXT BOX
79-year-old female with chest pain and shortness of breath for few days.  Patient appears very well.  Vital signs reviewed and are normal.  EKG and chest x-ray independently interpreted by me, ED attending Dr Becca Alexis.  Patient placed in observation unit for further cardiac workup.  Pain-free at present.

## 2024-12-26 NOTE — ED CDU PROVIDER INITIAL DAY NOTE - NS ED ATTENDING STATEMENT MOD
Vomiting 3 episodes yesterday.  None this am, still with nausea.  Care advice states home care, pt agrees and verbally understands.  Encouraged to call back if needed.    Reason for Disposition   Vomiting    Additional Information   Negative: Shock suspected (e.g., cold/pale/clammy skin, too weak to stand, low BP, rapid pulse)   Negative: Difficult to awaken or acting confused (e.g., disoriented, slurred speech)   Negative: Sounds like a life-threatening emergency to the triager   Negative: SEVERE vomiting (e.g., 6 or more times/day) (Exception: patient sounds well, is drinking liquids, does not sound dehydrated, and vomiting has lasted less than 24 hours)   Negative: MODERATE vomiting (e.g., 3 - 5 times/day) and age > 60 years   Negative: Vomiting contains bile (green color)   Negative: Vomiting red blood or black (coffee ground) material   Negative: Insulin-dependent diabetes and glucose > 240 mg/dL (13 mmol/L)   Negative: Recent head injury (within 3 days)   Negative: Recent abdominal injury (within 7 days)   Negative: Severe pain in one eye   Negative: Drinking very little and has signs of dehydration (e.g., no urine > 12 hours, very dry mouth, very lightheaded)   Negative: Constant abdominal pain lasting > 2 hours   Negative: High-risk adult (e.g., brain tumor, V-P shunt, hernia)   Negative: Patient sounds very sick or weak to the triager   Negative: Vomiting and abdomen looks much more swollen than usual   Negative: Fever > 103 F (39.4 C)   Negative: Fever > 101 F (38.3 C) and over 60 years of age   Negative: Fever > 100.0 F (37.8 C) and has a weak immune system (e.g., HIV positive, cancer chemo, organ transplant, splenectomy, chronic steroids)   Negative: Fever > 100.0 F (37.8 C) and bedridden (e.g., nursing home patient, stroke, chronic illness, recovering from surgery)   Negative: Taking any of the following medications: digoxin (Lanoxin), lithium, theophylline, phenytoin (Dilantin)    Negative: SEVERE headache and vomiting   Negative: MILD to MODERATE vomiting (e.g., 1-5 times/day) and lasts > 48 hours (2 days)   Negative: Fever present > 3 days (72 hours)   Negative: Patient wants to be seen   Negative: Vomiting a prescription medication   Negative: Substance use (drug use) or unhealthy alcohol use, known or suspected   Negative: Vomiting is a chronic symptom (recurrent or ongoing AND lasting > 4 weeks)    Protocols used: VOMITING-A-OH       I have seen and examined this patient and fully participated in the care of this patient as the teaching attending.  The service was shared with the RELL.  I reviewed and verified the documentation.

## 2024-12-26 NOTE — ED CDU PROVIDER INITIAL DAY NOTE - PHYSICAL EXAMINATION
Left detailed message on VM to call the office to schedule a FU appt.  PL   VITAL SIGNS: I have reviewed nursing notes and confirm.  CONSTITUTIONAL: Well-developed; well-nourished; in no acute distress.  SKIN: Skin exam is warm and dry, no acute rash.  HEAD: Normocephalic; atraumatic.  EYES: conjunctiva and sclera clear.  ENT: No nasal discharge  CARD: S1, S2 normal; no murmurs, gallops, or rubs. Regular rate and rhythm. Radial pulses 2+  RESP: CTA. No wheezes, rales or rhonchi. Speaking in full sentences.   ABD: Soft; non-distended; non-tender  EXT: Normal ROM. No cyanosis or edema.  NEURO: Alert, oriented. Grossly unremarkable. No focal deficits.

## 2024-12-26 NOTE — ED ADULT TRIAGE NOTE - NS ED TRIAGE EKG
Dear Ginny,    You may return to work with no restrictions.        You must continue to adhere to vigilant use of appropriate personal protective equipment (PPE) while in the workplace.      Cleared to return to work: 10/16/20       Continue to self-monitor for symptoms. If you should develop symptoms, do not report to work, notify your leader, and complete an exposure evaluation tool found  on the COVID-19 Information Center or call the employee COVID hotline at 1-468.372.5667.     Thank you,    [facility] COVID Team    [phone]    Hours: M-F [phone] Saturday - please answer your phone if an outside line is calling, regardless of hours we work 7days a week and will follow up as appropriate    [email or web address]        Cc: Manager      
EKG completed

## 2024-12-26 NOTE — ED PROVIDER NOTE - PHYSICAL EXAMINATION
VITAL SIGNS: I have reviewed nursing notes and confirm.  CONSTITUTIONAL: Well-developed; well-nourished; in no acute distress.  SKIN: Skin exam is warm and dry, no acute rash.  HEAD: Normocephalic; atraumatic.  EYES: PERRL, EOM intact; conjunctiva and sclera clear.  ENT: No nasal discharge  NECK: Supple; non tender.  CARD: S1, S2 normal; no murmurs, gallops, or rubs. Regular rate and rhythm. Radial pulses 2+  RESP: CTA. No wheezes, rales or rhonchi. Speaking in full sentences.   ABD: Soft; non-distended; non-tender  EXT: Normal ROM. No cyanosis or edema.  NEURO: Alert, oriented. Grossly unremarkable. No focal deficits.   PSYCH: Cooperative, appropriate.

## 2024-12-26 NOTE — ED CDU PROVIDER INITIAL DAY NOTE - OBJECTIVE STATEMENT
79 year old female past medical history of  COPD active smoker, diabetes, hypertension, carotid stenosis status post repair with no stenting, presenting for intermittent chest pain and shortness of breath. Pt had presyncopal event 2 days ago , seen in ED, signed out AMA. pT c/o some chest discomfort today that has since been resolved. Denies any recent LE edema, history of dvt/pe, nausea, vomiting

## 2024-12-26 NOTE — ED CDU PROVIDER INITIAL DAY NOTE - ATTENDING CONTRIBUTION TO CARE
Immediate family member 79 year old female past medical history of  COPD active smoker, diabetes, hypertension, carotid stenosis status post repair with no stenting, presenting for intermittent chest pain and shortness of breath. Pt had presyncopal event 2 days ago , seen in ED, signed out AMA. pT c/o some chest discomfort today that has since been resolved. Initial workup negative.

## 2024-12-27 VITALS — SYSTOLIC BLOOD PRESSURE: 145 MMHG | HEART RATE: 75 BPM | DIASTOLIC BLOOD PRESSURE: 76 MMHG

## 2024-12-27 PROCEDURE — 78452 HT MUSCLE IMAGE SPECT MULT: CPT | Mod: 26,MC

## 2024-12-27 PROCEDURE — 93018 CV STRESS TEST I&R ONLY: CPT

## 2024-12-27 PROCEDURE — 99238 HOSP IP/OBS DSCHRG MGMT 30/<: CPT

## 2024-12-27 PROCEDURE — 93016 CV STRESS TEST SUPVJ ONLY: CPT

## 2024-12-27 RX ADMIN — LOSARTAN POTASSIUM 50 MILLIGRAM(S): 100 TABLET, FILM COATED ORAL at 06:55

## 2024-12-27 RX ADMIN — AMLODIPINE BESYLATE 5 MILLIGRAM(S): 10 TABLET ORAL at 00:16

## 2024-12-27 RX ADMIN — ROSUVASTATIN CALCIUM 10 MILLIGRAM(S): 5 TABLET, FILM COATED ORAL at 00:16

## 2024-12-27 NOTE — ED CDU PROVIDER DISPOSITION NOTE - PATIENT PORTAL LINK FT
You can access the FollowMyHealth Patient Portal offered by [facility] by registering at the following website: [email or web address]. By joining [facility]’s FollowMyHealth portal, you will also be able to view your health information using other applications (apps) compatible with our system.

## 2024-12-27 NOTE — ED CDU PROVIDER DISPOSITION NOTE - CLINICAL COURSE
pt presented to ed for eval of chest pain. pt placed in edou under chest pain protocol. pt with workup including labs wnl, including 2 sets of negative cardiac enzymes that are reassuring, 2 ekg with no ischemic changes, normal cxray, normal nuclear stress test, no events on cardiac monitor. no chest pain at time of dc. pt advised to f/u with cardiology. Return for any concerning signs or symptoms.

## 2024-12-27 NOTE — ED CDU PROVIDER SUBSEQUENT DAY NOTE - PROGRESS NOTE DETAILS
Nuclear stress test unremarkable.  Patient is able for discharge. Patient has already been instructed to follow-up with her cardiologist outpatient.

## 2025-02-12 NOTE — DISCHARGE NOTE PROVIDER - PROVIDER TOKENS
Report was give by Bradley Poon in holding room. PROVIDER:[TOKEN:[[id number]],FOLLOWUP:[1 week]],PROVIDER:[TOKEN:[[id number]],FOLLOWUP:[1 week]]

## 2025-09-18 ENCOUNTER — EMERGENCY (EMERGENCY)
Facility: HOSPITAL | Age: 80
LOS: 0 days | Discharge: HOME | End: 2025-09-18
Attending: STUDENT IN AN ORGANIZED HEALTH CARE EDUCATION/TRAINING PROGRAM
Payer: MEDICARE

## 2025-09-18 VITALS
RESPIRATION RATE: 18 BRPM | WEIGHT: 125 LBS | OXYGEN SATURATION: 98 % | TEMPERATURE: 98 F | HEART RATE: 78 BPM | SYSTOLIC BLOOD PRESSURE: 101 MMHG | DIASTOLIC BLOOD PRESSURE: 69 MMHG

## 2025-09-18 VITALS
TEMPERATURE: 98 F | HEART RATE: 64 BPM | DIASTOLIC BLOOD PRESSURE: 75 MMHG | OXYGEN SATURATION: 98 % | SYSTOLIC BLOOD PRESSURE: 119 MMHG | RESPIRATION RATE: 18 BRPM

## 2025-09-18 DIAGNOSIS — R51.9 HEADACHE, UNSPECIFIED: ICD-10-CM

## 2025-09-18 DIAGNOSIS — R53.1 WEAKNESS: ICD-10-CM

## 2025-09-18 DIAGNOSIS — U07.1 COVID-19: ICD-10-CM

## 2025-09-18 DIAGNOSIS — Z90.49 ACQUIRED ABSENCE OF OTHER SPECIFIED PARTS OF DIGESTIVE TRACT: Chronic | ICD-10-CM

## 2025-09-18 LAB
ALBUMIN SERPL ELPH-MCNC: 4.5 G/DL — SIGNIFICANT CHANGE UP (ref 3.5–5.2)
ALP SERPL-CCNC: 65 U/L — SIGNIFICANT CHANGE UP (ref 30–115)
ALT FLD-CCNC: 103 U/L — HIGH (ref 0–41)
ANION GAP SERPL CALC-SCNC: 15 MMOL/L — HIGH (ref 7–14)
AST SERPL-CCNC: 84 U/L — HIGH (ref 0–41)
BASOPHILS # BLD AUTO: 0.05 K/UL — SIGNIFICANT CHANGE UP (ref 0–0.2)
BASOPHILS NFR BLD AUTO: 1.4 % — SIGNIFICANT CHANGE UP (ref 0–2)
BILIRUB SERPL-MCNC: <0.2 MG/DL — SIGNIFICANT CHANGE UP (ref 0.2–1.2)
BUN SERPL-MCNC: 17 MG/DL — SIGNIFICANT CHANGE UP (ref 10–20)
CALCIUM SERPL-MCNC: 9.1 MG/DL — SIGNIFICANT CHANGE UP (ref 8.4–10.5)
CHLORIDE SERPL-SCNC: 101 MMOL/L — SIGNIFICANT CHANGE UP (ref 98–110)
CO2 SERPL-SCNC: 23 MMOL/L — SIGNIFICANT CHANGE UP (ref 17–32)
CREAT SERPL-MCNC: 0.8 MG/DL — SIGNIFICANT CHANGE UP (ref 0.7–1.5)
EGFR: 75 ML/MIN/1.73M2 — SIGNIFICANT CHANGE UP
EGFR: 75 ML/MIN/1.73M2 — SIGNIFICANT CHANGE UP
EOSINOPHIL # BLD AUTO: 0.01 K/UL — SIGNIFICANT CHANGE UP (ref 0–0.5)
EOSINOPHIL NFR BLD AUTO: 0.3 % — SIGNIFICANT CHANGE UP (ref 0–6)
FLUAV AG NPH QL: SIGNIFICANT CHANGE UP
FLUBV AG NPH QL: SIGNIFICANT CHANGE UP
GLUCOSE SERPL-MCNC: 97 MG/DL — SIGNIFICANT CHANGE UP (ref 70–99)
HCT VFR BLD CALC: 41.2 % — SIGNIFICANT CHANGE UP (ref 34.5–45)
HGB BLD-MCNC: 13.2 G/DL — SIGNIFICANT CHANGE UP (ref 11.5–15.5)
IMM GRANULOCYTES # BLD AUTO: 0.01 K/UL — SIGNIFICANT CHANGE UP (ref 0–0.07)
IMM GRANULOCYTES NFR BLD AUTO: 0.3 % — SIGNIFICANT CHANGE UP (ref 0–0.9)
LYMPHOCYTES # BLD AUTO: 1.58 K/UL — SIGNIFICANT CHANGE UP (ref 1–3.3)
LYMPHOCYTES NFR BLD AUTO: 44 % — SIGNIFICANT CHANGE UP (ref 13–44)
MCHC RBC-ENTMCNC: 29.9 PG — SIGNIFICANT CHANGE UP (ref 27–34)
MCHC RBC-ENTMCNC: 32 G/DL — SIGNIFICANT CHANGE UP (ref 32–36)
MCV RBC AUTO: 93.4 FL — SIGNIFICANT CHANGE UP (ref 80–100)
MONOCYTES # BLD AUTO: 0.68 K/UL — SIGNIFICANT CHANGE UP (ref 0–0.9)
MONOCYTES NFR BLD AUTO: 18.9 % — HIGH (ref 2–14)
NEUTROPHILS # BLD AUTO: 1.26 K/UL — LOW (ref 1.8–7.4)
NEUTROPHILS NFR BLD AUTO: 35.1 % — LOW (ref 43–77)
NRBC # BLD AUTO: 0 K/UL — SIGNIFICANT CHANGE UP (ref 0–0)
NRBC # FLD: 0 K/UL — SIGNIFICANT CHANGE UP (ref 0–0)
NRBC BLD AUTO-RTO: 0 /100 WBCS — SIGNIFICANT CHANGE UP (ref 0–0)
NT-PROBNP SERPL-SCNC: 124 PG/ML — SIGNIFICANT CHANGE UP (ref 0–300)
PLATELET # BLD AUTO: 117 K/UL — LOW (ref 150–400)
PMV BLD: 10.7 FL — SIGNIFICANT CHANGE UP (ref 7–13)
POTASSIUM SERPL-MCNC: 4.7 MMOL/L — SIGNIFICANT CHANGE UP (ref 3.5–5)
POTASSIUM SERPL-SCNC: 4.7 MMOL/L — SIGNIFICANT CHANGE UP (ref 3.5–5)
PROT SERPL-MCNC: 6.9 G/DL — SIGNIFICANT CHANGE UP (ref 6–8)
RBC # BLD: 4.41 M/UL — SIGNIFICANT CHANGE UP (ref 3.8–5.2)
RBC # FLD: 14.3 % — SIGNIFICANT CHANGE UP (ref 10.3–14.5)
RSV RNA NPH QL NAA+NON-PROBE: SIGNIFICANT CHANGE UP
SARS-COV-2 RNA SPEC QL NAA+PROBE: DETECTED
SODIUM SERPL-SCNC: 139 MMOL/L — SIGNIFICANT CHANGE UP (ref 135–146)
SOURCE RESPIRATORY: SIGNIFICANT CHANGE UP
TROPONIN T, HIGH SENSITIVITY RESULT: 44 NG/L — HIGH (ref 6–13)
TROPONIN T, HIGH SENSITIVITY RESULT: 50 NG/L — HIGH (ref 6–13)
WBC # BLD: 3.59 K/UL — LOW (ref 3.8–10.5)
WBC # FLD AUTO: 3.59 K/UL — LOW (ref 3.8–10.5)

## 2025-09-18 PROCEDURE — 82962 GLUCOSE BLOOD TEST: CPT

## 2025-09-18 PROCEDURE — 83880 ASSAY OF NATRIURETIC PEPTIDE: CPT

## 2025-09-18 PROCEDURE — 93005 ELECTROCARDIOGRAM TRACING: CPT

## 2025-09-18 PROCEDURE — 71045 X-RAY EXAM CHEST 1 VIEW: CPT

## 2025-09-18 PROCEDURE — 36000 PLACE NEEDLE IN VEIN: CPT

## 2025-09-18 PROCEDURE — 70450 CT HEAD/BRAIN W/O DYE: CPT | Mod: 26

## 2025-09-18 PROCEDURE — 36415 COLL VENOUS BLD VENIPUNCTURE: CPT

## 2025-09-18 PROCEDURE — 85025 COMPLETE CBC W/AUTO DIFF WBC: CPT

## 2025-09-18 PROCEDURE — 87637 SARSCOV2&INF A&B&RSV AMP PRB: CPT

## 2025-09-18 PROCEDURE — 71045 X-RAY EXAM CHEST 1 VIEW: CPT | Mod: 26

## 2025-09-18 PROCEDURE — 80053 COMPREHEN METABOLIC PANEL: CPT

## 2025-09-18 PROCEDURE — 99285 EMERGENCY DEPT VISIT HI MDM: CPT | Mod: FS

## 2025-09-18 PROCEDURE — 84484 ASSAY OF TROPONIN QUANT: CPT

## 2025-09-18 PROCEDURE — 99285 EMERGENCY DEPT VISIT HI MDM: CPT | Mod: 25

## 2025-09-18 PROCEDURE — 93010 ELECTROCARDIOGRAM REPORT: CPT

## 2025-09-18 PROCEDURE — 70450 CT HEAD/BRAIN W/O DYE: CPT

## 2025-09-18 RX ORDER — SODIUM CHLORIDE 9 G/1000ML
1000 INJECTION, SOLUTION INTRAVENOUS ONCE
Refills: 0 | Status: COMPLETED | OUTPATIENT
Start: 2025-09-18 | End: 2025-09-18

## 2025-09-18 RX ADMIN — SODIUM CHLORIDE 1000 MILLILITER(S): 9 INJECTION, SOLUTION INTRAVENOUS at 13:54
